# Patient Record
Sex: MALE | Race: WHITE | Employment: FULL TIME | URBAN - METROPOLITAN AREA
[De-identification: names, ages, dates, MRNs, and addresses within clinical notes are randomized per-mention and may not be internally consistent; named-entity substitution may affect disease eponyms.]

---

## 2017-11-07 ENCOUNTER — APPOINTMENT (OUTPATIENT)
Dept: GENERAL RADIOLOGY | Facility: HOSPITAL | Age: 57
End: 2017-11-07
Attending: EMERGENCY MEDICINE
Payer: COMMERCIAL

## 2017-11-07 ENCOUNTER — HOSPITAL ENCOUNTER (EMERGENCY)
Facility: HOSPITAL | Age: 57
Discharge: HOME | End: 2017-11-07
Attending: EMERGENCY MEDICINE
Payer: COMMERCIAL

## 2017-11-07 VITALS
RESPIRATION RATE: 22 BRPM | HEART RATE: 86 BPM | BODY MASS INDEX: 29.33 KG/M2 | DIASTOLIC BLOOD PRESSURE: 81 MMHG | OXYGEN SATURATION: 98 % | WEIGHT: 198 LBS | HEIGHT: 69 IN | TEMPERATURE: 98 F | SYSTOLIC BLOOD PRESSURE: 139 MMHG

## 2017-11-07 DIAGNOSIS — J20.9 ACUTE BRONCHITIS, UNSPECIFIED ORGANISM: Primary | ICD-10-CM

## 2017-11-07 PROCEDURE — 94640 AIRWAY INHALATION TREATMENT: CPT

## 2017-11-07 PROCEDURE — 99283 EMERGENCY DEPT VISIT LOW MDM: CPT

## 2017-11-07 PROCEDURE — 71020 XR CHEST PA + LAT CHEST (CPT=71020): CPT | Performed by: EMERGENCY MEDICINE

## 2017-11-07 PROCEDURE — 99284 EMERGENCY DEPT VISIT MOD MDM: CPT

## 2017-11-07 RX ORDER — ALBUTEROL SULFATE 90 UG/1
2 AEROSOL, METERED RESPIRATORY (INHALATION) EVERY 4 HOURS PRN
Qty: 1 INHALER | Refills: 0 | Status: SHIPPED | OUTPATIENT
Start: 2017-11-07 | End: 2017-12-07

## 2017-11-07 RX ORDER — CODEINE PHOSPHATE AND GUAIFENESIN 10; 100 MG/5ML; MG/5ML
5 SOLUTION ORAL EVERY 6 HOURS PRN
Qty: 120 ML | Refills: 0 | Status: SHIPPED | OUTPATIENT
Start: 2017-11-07 | End: 2021-10-11

## 2017-11-07 RX ORDER — ALBUTEROL SULFATE 2.5 MG/3ML
2.5 SOLUTION RESPIRATORY (INHALATION) ONCE
Status: COMPLETED | OUTPATIENT
Start: 2017-11-07 | End: 2017-11-07

## 2017-11-07 RX ORDER — PREDNISONE 20 MG/1
40 TABLET ORAL DAILY
Qty: 6 TABLET | Refills: 0 | Status: SHIPPED | OUTPATIENT
Start: 2017-11-07 | End: 2017-11-10

## 2017-11-07 NOTE — ED INITIAL ASSESSMENT (HPI)
Pt to ED with c/o cough and fever that started yesterday. Sts he is coughing up yellow phlegm. Denies CP. No resp distress noted. Dry cough noted.

## 2017-11-07 NOTE — ED PROVIDER NOTES
Patient Seen in: [facility] Emergency Department    History   Patient presents with:  Cough/URI    Stated Complaint: cough,fever    HPI    53-year-old male complaining of cough the patient a cough felt like he might had a fever coughing up some yell given a prescription for prednisone for 3 days a Robitussin-AC and Ventolin inhaler advised to follow-up his doctor in a few days return any problems            Disposition and Plan     Clinical Impression:  Acute bronchitis, unspecified organism  (primary

## 2017-11-07 NOTE — ED NOTES
DC instructions and RXs handed to pt. Pt denies any needs at this time. No distress noted. Pt on cell phone. Denies need for [facility] out of ED.

## 2019-11-18 ENCOUNTER — HOSPITAL ENCOUNTER (EMERGENCY)
Facility: HOSPITAL | Age: 59
Discharge: HOME | End: 2019-11-18
Attending: EMERGENCY MEDICINE
Payer: COMMERCIAL

## 2019-11-18 ENCOUNTER — LAB ENCOUNTER (OUTPATIENT)
Dept: LAB | Facility: HOSPITAL | Age: 59
End: 2019-11-18
Attending: EMERGENCY MEDICINE
Payer: COMMERCIAL

## 2019-11-18 VITALS
BODY MASS INDEX: 30.31 KG/M2 | HEIGHT: 68 IN | RESPIRATION RATE: 16 BRPM | HEART RATE: 72 BPM | OXYGEN SATURATION: 97 % | WEIGHT: 200 LBS | DIASTOLIC BLOOD PRESSURE: 71 MMHG | TEMPERATURE: 98 F | SYSTOLIC BLOOD PRESSURE: 128 MMHG

## 2019-11-18 DIAGNOSIS — R19.7 DIARRHEA: Primary | ICD-10-CM

## 2019-11-18 DIAGNOSIS — N28.9 RENAL INSUFFICIENCY: ICD-10-CM

## 2019-11-18 DIAGNOSIS — E86.0 DEHYDRATION: Primary | ICD-10-CM

## 2019-11-18 PROCEDURE — 99284 EMERGENCY DEPT VISIT MOD MDM: CPT

## 2019-11-18 PROCEDURE — 87045 FECES CULTURE AEROBIC BACT: CPT | Performed by: EMERGENCY MEDICINE

## 2019-11-18 PROCEDURE — 87427 SHIGA-LIKE TOXIN AG IA: CPT | Performed by: EMERGENCY MEDICINE

## 2019-11-18 PROCEDURE — 84132 ASSAY OF SERUM POTASSIUM: CPT | Performed by: EMERGENCY MEDICINE

## 2019-11-18 PROCEDURE — 84450 TRANSFERASE (AST) (SGOT): CPT | Performed by: EMERGENCY MEDICINE

## 2019-11-18 PROCEDURE — 87493 C DIFF AMPLIFIED PROBE: CPT

## 2019-11-18 PROCEDURE — 87046 STOOL CULTR AEROBIC BACT EA: CPT | Performed by: EMERGENCY MEDICINE

## 2019-11-18 PROCEDURE — 82272 OCCULT BLD FECES 1-3 TESTS: CPT | Performed by: EMERGENCY MEDICINE

## 2019-11-18 PROCEDURE — 85025 COMPLETE CBC W/AUTO DIFF WBC: CPT | Performed by: EMERGENCY MEDICINE

## 2019-11-18 PROCEDURE — 87493 C DIFF AMPLIFIED PROBE: CPT | Performed by: EMERGENCY MEDICINE

## 2019-11-18 PROCEDURE — 80053 COMPREHEN METABOLIC PANEL: CPT | Performed by: EMERGENCY MEDICINE

## 2019-11-18 PROCEDURE — 96360 HYDRATION IV INFUSION INIT: CPT

## 2019-11-18 RX ORDER — ALPRAZOLAM 0.25 MG/1
0.25 TABLET ORAL 3 TIMES DAILY PRN
COMMUNITY
End: 2021-12-22

## 2019-11-18 RX ORDER — ATORVASTATIN CALCIUM 40 MG/1
40 TABLET, FILM COATED ORAL NIGHTLY
COMMUNITY
End: 2021-10-11

## 2019-11-18 RX ORDER — PANTOPRAZOLE SODIUM 40 MG/1
40 TABLET, DELAYED RELEASE ORAL
COMMUNITY
End: 2021-10-11

## 2019-11-18 NOTE — ED PROVIDER NOTES
Patient Seen in: [facility] Emergency Department      History   Patient presents with:  Nausea/Vomiting/Diarrhea (gastrointestinal)    Stated Complaint: NVD    HPI    Patient is a 53-year-old male presents to ED for evaluation and diarrhea.   Patient MUSCULOSKELETAL: No calf tenderness. Dorsalis and Posterior Tibial pulses present. No clubbing. No cyanosis. No edema. SKIN EXAMINATIoN: Warm and dry with normal appearance. No rashes or lesions. NEUROLOGICAL:  Motor strength intact all groups.   no tests on the individual orders. STOOL CULTURE(P)   SHIGATOXIN   Sodium 133. BUN 35. Creatinine 1.36. Glucose 101               MDM     Patient is a 16-year-old male comes to emergency room for evaluation of loose stool. Stool sample sent.   Patient wi

## 2019-11-18 NOTE — ED INITIAL ASSESSMENT (HPI)
PT CO URI AND DIARRHEA. PT WANTS TO HAVE HIS KIDNEYS CHECKED. HX IN PAST OF CDIFF 2015 BUT HAS NOT BEEN ON ANY ANTIBX.

## 2019-11-18 NOTE — ED NOTES
Spoke  With patient and informed that patient stool for c diff had to be canceled due to formed stool

## 2021-06-22 ENCOUNTER — IMAGING SERVICES (OUTPATIENT)
Dept: CT IMAGING | Age: 61
End: 2021-06-22
Attending: UROLOGY

## 2021-06-22 DIAGNOSIS — R31.0 GROSS HEMATURIA: ICD-10-CM

## 2021-06-22 PROCEDURE — 74178 CT ABD&PLV WO CNTR FLWD CNTR: CPT | Performed by: RADIOLOGY

## 2021-10-11 ENCOUNTER — OFFICE VISIT (OUTPATIENT)
Dept: INTERNAL MEDICINE CLINIC | Facility: CLINIC | Age: 61
End: 2021-10-11
Payer: COMMERCIAL

## 2021-10-11 VITALS
HEIGHT: 68 IN | HEART RATE: 68 BPM | WEIGHT: 194.38 LBS | DIASTOLIC BLOOD PRESSURE: 60 MMHG | OXYGEN SATURATION: 97 % | BODY MASS INDEX: 29.46 KG/M2 | SYSTOLIC BLOOD PRESSURE: 118 MMHG

## 2021-10-11 DIAGNOSIS — E78.1 HYPERTRIGLYCERIDEMIA: ICD-10-CM

## 2021-10-11 DIAGNOSIS — C67.9 MALIGNANT NEOPLASM OF URINARY BLADDER, UNSPECIFIED SITE (HCC): ICD-10-CM

## 2021-10-11 DIAGNOSIS — I10 ESSENTIAL HYPERTENSION: Primary | ICD-10-CM

## 2021-10-11 DIAGNOSIS — R73.03 PREDIABETES: ICD-10-CM

## 2021-10-11 DIAGNOSIS — Z87.39 HISTORY OF GOUT: ICD-10-CM

## 2021-10-11 DIAGNOSIS — F41.9 ANXIETY: ICD-10-CM

## 2021-10-11 PROCEDURE — 3074F SYST BP LT 130 MM HG: CPT | Performed by: FAMILY MEDICINE

## 2021-10-11 PROCEDURE — 99204 OFFICE O/P NEW MOD 45 MIN: CPT | Performed by: FAMILY MEDICINE

## 2021-10-11 PROCEDURE — 90471 IMMUNIZATION ADMIN: CPT | Performed by: FAMILY MEDICINE

## 2021-10-11 PROCEDURE — 3008F BODY MASS INDEX DOCD: CPT | Performed by: FAMILY MEDICINE

## 2021-10-11 PROCEDURE — 3078F DIAST BP <80 MM HG: CPT | Performed by: FAMILY MEDICINE

## 2021-10-11 PROCEDURE — 90686 IIV4 VACC NO PRSV 0.5 ML IM: CPT | Performed by: FAMILY MEDICINE

## 2021-10-11 RX ORDER — AZILSARTAN KAMEDOXOMIL AND CHLORTHALIDONE 40; 25 MG/1; MG/1
1 TABLET ORAL DAILY
COMMUNITY
Start: 2019-05-31

## 2021-10-11 RX ORDER — ALBUTEROL SULFATE 90 UG/1
AEROSOL, METERED RESPIRATORY (INHALATION) EVERY 6 HOURS PRN
COMMUNITY

## 2021-10-11 RX ORDER — ROSUVASTATIN CALCIUM 10 MG/1
10 TABLET, COATED ORAL DAILY
COMMUNITY

## 2021-10-11 RX ORDER — AMLODIPINE BESYLATE 5 MG/1
5 TABLET ORAL DAILY
COMMUNITY

## 2021-10-11 RX ORDER — MONTELUKAST SODIUM 10 MG/1
10 TABLET ORAL DAILY
COMMUNITY
Start: 2021-08-27

## 2021-10-11 NOTE — PROGRESS NOTES
Subjective:   Patient ID: Félix Madison is a 64year old male. HPI Here to establish care. Patient sees Cardiology twice yearly. PCP had been filling his BP and cholesterol med though. Sees Urology for bladder cancer, treating with BCG.  Sees allergi mouth daily. • ALPRAZolam 0.25 MG Oral Tab Take 0.25 mg by mouth 3 (three) times daily as needed. • allopurinol 300 MG Oral Tab Take 300 mg by mouth daily. • Nebivolol HCl (BYSTOLIC) 10 MG Oral Tab Take 20 mg by mouth daily.        Allergies:  S

## 2021-10-12 ENCOUNTER — MED REC SCAN ONLY (OUTPATIENT)
Dept: INTERNAL MEDICINE CLINIC | Facility: CLINIC | Age: 61
End: 2021-10-12

## 2021-12-22 DIAGNOSIS — I10 ESSENTIAL HYPERTENSION: Primary | ICD-10-CM

## 2021-12-22 DIAGNOSIS — Z13.0 SCREENING FOR BLOOD DISEASE: ICD-10-CM

## 2021-12-22 DIAGNOSIS — Z13.228 SCREENING FOR METABOLIC DISORDER: ICD-10-CM

## 2021-12-22 DIAGNOSIS — E78.1 HYPERTRIGLYCERIDEMIA: ICD-10-CM

## 2021-12-22 DIAGNOSIS — R73.03 PREDIABETES: ICD-10-CM

## 2021-12-22 DIAGNOSIS — Z00.00 ANNUAL PHYSICAL EXAM: ICD-10-CM

## 2021-12-22 DIAGNOSIS — Z13.220 SCREENING FOR LIPID DISORDERS: ICD-10-CM

## 2021-12-22 RX ORDER — ALPRAZOLAM 0.25 MG/1
TABLET ORAL
Qty: 30 TABLET | Refills: 0 | Status: SHIPPED | OUTPATIENT
Start: 2021-12-22

## 2021-12-22 NOTE — TELEPHONE ENCOUNTER
Last visit- 10/11/2021 hypertension     Last refill- alprazolam 0.25mg (external/patient, reported)    Last labs- no recent labs     No future appointments.

## 2022-02-25 ENCOUNTER — TELEPHONE (OUTPATIENT)
Dept: INTERNAL MEDICINE CLINIC | Facility: CLINIC | Age: 62
End: 2022-02-25

## 2022-02-25 NOTE — TELEPHONE ENCOUNTER
CPE   Future Appointments   Date Time Provider Shan Charito   3/30/2022  4:45 PM Abby Healy,  [facility]   4/18/2022  7:00 AM Davide Jones MD [facility] SUB GI      Orders to  ?  Will call to let us what's his preferred lab   aware must fast no call back required

## 2022-02-25 NOTE — TELEPHONE ENCOUNTER
CPE   Future Appointments   Date Time Provider Shan Mcneill   3/30/2022  4:45 PM Jordan Rizzo DO [facility]      Orders to  aware must fast no call back required

## 2022-02-28 RX ORDER — AMLODIPINE BESYLATE 5 MG/1
TABLET ORAL
Qty: 90 TABLET | Refills: 0 | Status: SHIPPED | OUTPATIENT
Start: 2022-02-28

## 2022-02-28 NOTE — TELEPHONE ENCOUNTER
Last visit- 10/11/2021 hypertension     Last refill- amlodipine 5mg (external/patient reported)    Last labs- no recent labs   Future Appointments   Date Time Provider Shan Mcneill   3/30/2022  4:45 PM Magali Moreno DO [facility]   4/18/2022  7:00 AM Orestes Jones MD [facility] SUB GI

## 2022-03-16 RX ORDER — ALPRAZOLAM 0.25 MG/1
TABLET ORAL
Qty: 30 TABLET | Refills: 0 | Status: SHIPPED | OUTPATIENT
Start: 2022-03-16

## 2022-03-16 NOTE — TELEPHONE ENCOUNTER
Last visit- 10/11/2021 hypertension     Last refill- 12/22/2021 alprazolam 0.25mg QTY30 0R    Last labs- no recent labs   Future Appointments   Date Time Provider Shan Charito   3/30/2022  4:45 PM Kevin Sep, DO [facility]   4/18/2022  7:00 AM Luna Jones MD [facility] SUB GI

## 2022-03-25 ENCOUNTER — LAB ENCOUNTER (OUTPATIENT)
Dept: LAB | Age: 62
End: 2022-03-25
Attending: FAMILY MEDICINE
Payer: COMMERCIAL

## 2022-03-25 DIAGNOSIS — Z13.220 SCREENING FOR LIPID DISORDERS: ICD-10-CM

## 2022-03-25 DIAGNOSIS — Z13.228 SCREENING FOR METABOLIC DISORDER: ICD-10-CM

## 2022-03-25 DIAGNOSIS — R73.03 PREDIABETES: ICD-10-CM

## 2022-03-25 DIAGNOSIS — Z13.0 SCREENING FOR BLOOD DISEASE: ICD-10-CM

## 2022-03-25 DIAGNOSIS — Z00.00 ANNUAL PHYSICAL EXAM: ICD-10-CM

## 2022-03-25 DIAGNOSIS — I10 ESSENTIAL HYPERTENSION: ICD-10-CM

## 2022-03-25 DIAGNOSIS — E78.1 HYPERTRIGLYCERIDEMIA: ICD-10-CM

## 2022-03-25 LAB
ALBUMIN SERPL-MCNC: 4.1 G/DL (ref 3.4–5)
ALBUMIN/GLOB SERPL: 1.2 {RATIO} (ref 1–2)
ALP LIVER SERPL-CCNC: 91 U/L
ALT SERPL-CCNC: 33 U/L
ANION GAP SERPL CALC-SCNC: 1 MMOL/L (ref 0–18)
AST SERPL-CCNC: 19 U/L (ref 15–37)
BASOPHILS # BLD AUTO: 0.04 X10(3) UL (ref 0–0.2)
BASOPHILS NFR BLD AUTO: 0.5 %
BILIRUB SERPL-MCNC: 0.4 MG/DL (ref 0.1–2)
BUN BLD-MCNC: 34 MG/DL (ref 7–18)
CALCIUM BLD-MCNC: 9.4 MG/DL (ref 8.5–10.1)
CHLORIDE SERPL-SCNC: 104 MMOL/L (ref 98–112)
CHOLEST SERPL-MCNC: 165 MG/DL (ref ?–200)
CO2 SERPL-SCNC: 31 MMOL/L (ref 21–32)
CREAT BLD-MCNC: 0.96 MG/DL
EOSINOPHIL # BLD AUTO: 0.22 X10(3) UL (ref 0–0.7)
EOSINOPHIL NFR BLD AUTO: 2.8 %
ERYTHROCYTE [DISTWIDTH] IN BLOOD BY AUTOMATED COUNT: 12.2 %
EST. AVERAGE GLUCOSE BLD GHB EST-MCNC: 120 MG/DL (ref 68–126)
FASTING PATIENT LIPID ANSWER: YES
FASTING STATUS PATIENT QL REPORTED: YES
GLOBULIN PLAS-MCNC: 3.5 G/DL (ref 2.8–4.4)
GLUCOSE BLD-MCNC: 104 MG/DL (ref 70–99)
HBA1C MFR BLD: 5.8 % (ref ?–5.7)
HCT VFR BLD AUTO: 46.7 %
HDLC SERPL-MCNC: 50 MG/DL (ref 40–59)
IMM GRANULOCYTES # BLD AUTO: 0.03 X10(3) UL (ref 0–1)
IMM GRANULOCYTES NFR BLD: 0.4 %
LDLC SERPL CALC-MCNC: 83 MG/DL (ref ?–100)
LYMPHOCYTES # BLD AUTO: 3.53 X10(3) UL (ref 1–4)
LYMPHOCYTES NFR BLD AUTO: 44.3 %
MCH RBC QN AUTO: 33.7 PG (ref 26–34)
MCHC RBC AUTO-ENTMCNC: 34.5 G/DL (ref 31–37)
MCV RBC AUTO: 97.7 FL
MONOCYTES NFR BLD AUTO: 8.8 %
NEUTROPHILS # BLD AUTO: 3.45 X10 (3) UL (ref 1.5–7.7)
NEUTROPHILS # BLD AUTO: 3.45 X10(3) UL (ref 1.5–7.7)
NEUTROPHILS NFR BLD AUTO: 43.2 %
NONHDLC SERPL-MCNC: 115 MG/DL (ref ?–130)
OSMOLALITY SERPL CALC.SUM OF ELEC: 290 MOSM/KG (ref 275–295)
PLATELET # BLD AUTO: 278 10(3)UL (ref 150–450)
POTASSIUM SERPL-SCNC: 4 MMOL/L (ref 3.5–5.1)
PROT SERPL-MCNC: 7.6 G/DL (ref 6.4–8.2)
RBC # BLD AUTO: 4.78 X10(6)UL
SODIUM SERPL-SCNC: 136 MMOL/L (ref 136–145)
TRIGL SERPL-MCNC: 190 MG/DL (ref 30–149)
VLDLC SERPL CALC-MCNC: 30 MG/DL (ref 0–30)
WBC # BLD AUTO: 8 X10(3) UL (ref 4–11)

## 2022-03-25 PROCEDURE — 80053 COMPREHEN METABOLIC PANEL: CPT

## 2022-03-25 PROCEDURE — 83036 HEMOGLOBIN GLYCOSYLATED A1C: CPT

## 2022-03-25 PROCEDURE — 85025 COMPLETE CBC W/AUTO DIFF WBC: CPT

## 2022-03-25 PROCEDURE — 36415 COLL VENOUS BLD VENIPUNCTURE: CPT

## 2022-03-25 PROCEDURE — 80061 LIPID PANEL: CPT

## 2022-03-30 ENCOUNTER — OFFICE VISIT (OUTPATIENT)
Dept: INTERNAL MEDICINE CLINIC | Facility: CLINIC | Age: 62
End: 2022-03-30
Payer: COMMERCIAL

## 2022-03-30 VITALS
BODY MASS INDEX: 29.86 KG/M2 | HEART RATE: 71 BPM | OXYGEN SATURATION: 96 % | HEIGHT: 68 IN | SYSTOLIC BLOOD PRESSURE: 120 MMHG | DIASTOLIC BLOOD PRESSURE: 64 MMHG | WEIGHT: 197 LBS

## 2022-03-30 DIAGNOSIS — Z00.00 ANNUAL PHYSICAL EXAM: Primary | ICD-10-CM

## 2022-03-30 DIAGNOSIS — I10 PRIMARY HYPERTENSION: ICD-10-CM

## 2022-03-30 DIAGNOSIS — F41.1 GAD (GENERALIZED ANXIETY DISORDER): ICD-10-CM

## 2022-03-30 DIAGNOSIS — C67.9 MALIGNANT NEOPLASM OF URINARY BLADDER, UNSPECIFIED SITE (HCC): ICD-10-CM

## 2022-03-30 DIAGNOSIS — R73.03 PREDIABETES: ICD-10-CM

## 2022-03-30 PROCEDURE — 3078F DIAST BP <80 MM HG: CPT | Performed by: FAMILY MEDICINE

## 2022-03-30 PROCEDURE — 99213 OFFICE O/P EST LOW 20 MIN: CPT | Performed by: FAMILY MEDICINE

## 2022-03-30 PROCEDURE — 96127 BRIEF EMOTIONAL/BEHAV ASSMT: CPT | Performed by: FAMILY MEDICINE

## 2022-03-30 PROCEDURE — 3008F BODY MASS INDEX DOCD: CPT | Performed by: FAMILY MEDICINE

## 2022-03-30 PROCEDURE — 99396 PREV VISIT EST AGE 40-64: CPT | Performed by: FAMILY MEDICINE

## 2022-03-30 PROCEDURE — 3074F SYST BP LT 130 MM HG: CPT | Performed by: FAMILY MEDICINE

## 2022-03-30 RX ORDER — ESCITALOPRAM OXALATE 10 MG/1
TABLET ORAL
Qty: 30 TABLET | Refills: 0 | Status: SHIPPED | OUTPATIENT
Start: 2022-03-30

## 2022-05-16 RX ORDER — SERTRALINE HYDROCHLORIDE 25 MG/1
50 TABLET, FILM COATED ORAL DAILY
Qty: 180 TABLET | Refills: 1 | Status: SHIPPED | OUTPATIENT
Start: 2022-05-16 | End: 2022-08-14

## 2022-05-16 NOTE — TELEPHONE ENCOUNTER
Last visit04/22/2022 betsy    Last refill- 04/22/2022 sertraline 25mg QTY60 0R    Last labs- 03/25/2022 lipid, cbc, cmp, hemoglobin a1c  Future Appointments   Date Time Provider Shan Mcneill   6/15/2022  2:45 PM Hayden Ramos MD [facility] SUB GI

## 2022-05-27 RX ORDER — AMLODIPINE BESYLATE 5 MG/1
TABLET ORAL
Qty: 90 TABLET | Refills: 0 | Status: SHIPPED | OUTPATIENT
Start: 2022-05-27

## 2022-05-27 NOTE — TELEPHONE ENCOUNTER
Last visit- 04/22/2022 betsy    Last refill- 02/28/2022 amlodipine 5mg QTY90 0R    Last labs- 03/25/2022 lipid, cbc, cmp, hemoglobin a1c  Future Appointments   Date Time Provider Shan Mcneill   6/15/2022  2:45 PM Getachew Brooks MD [facility] SUB GI

## 2022-06-12 ENCOUNTER — LAB ENCOUNTER (OUTPATIENT)
Dept: LAB | Facility: HOSPITAL | Age: 62
End: 2022-06-12
Attending: INTERNAL MEDICINE
Payer: COMMERCIAL

## 2022-06-12 DIAGNOSIS — Z01.818 PRE-OP TESTING: ICD-10-CM

## 2022-06-12 LAB — SARS-COV-2 RNA RESP QL NAA+PROBE: NOT DETECTED

## 2022-06-13 RX ORDER — NEBIVOLOL 20 MG/1
TABLET ORAL
Qty: 90 TABLET | Refills: 1 | Status: SHIPPED | OUTPATIENT
Start: 2022-06-13

## 2022-06-13 NOTE — TELEPHONE ENCOUNTER
Last visit- 04/22/2022 betsy     Last refill- nebivolol hcl (bystolic) 02PL (external/patient reported)    Last labs- 03/25/2022 lipid, cbc, cmp, hemoglobin a1c  Future Appointments   Date Time Provider Shan Mcneill   6/15/2022  2:45 PM Dakota Oconnell MD [facility] SUB GI

## 2022-06-15 PROBLEM — Z86.0101 HISTORY OF ADENOMATOUS POLYP OF COLON: Status: ACTIVE | Noted: 2022-06-15

## 2022-06-15 PROBLEM — Z80.0 FAMILY HISTORY OF COLON CANCER: Status: ACTIVE | Noted: 2022-06-15

## 2022-06-15 PROBLEM — Z86.010 HISTORY OF ADENOMATOUS POLYP OF COLON: Status: ACTIVE | Noted: 2022-06-15

## 2022-06-28 ENCOUNTER — TELEPHONE (OUTPATIENT)
Dept: INTERNAL MEDICINE CLINIC | Facility: CLINIC | Age: 62
End: 2022-06-28

## 2022-06-28 DIAGNOSIS — Z01.818 PREOPERATIVE EXAMINATION: ICD-10-CM

## 2022-06-28 DIAGNOSIS — C67.9 MALIGNANT NEOPLASM OF URINARY BLADDER, UNSPECIFIED SITE (HCC): Primary | ICD-10-CM

## 2022-06-28 NOTE — TELEPHONE ENCOUNTER
Pt stated he is off work the next 3 days in case he needs labs done wants to do it during the next 3 days-please call him and let him know if he will need labs done

## 2022-06-28 NOTE — TELEPHONE ENCOUNTER
Please call [facility] at [phone] to get formal pre-op request including diagnosis (reason for procedure), procedure name, request for medical clearance, specific labs/EKG/chest xray patient is getting done, surgeon, and date of procedure.

## 2022-06-28 NOTE — TELEPHONE ENCOUNTER
Patient walked into office with pre-op paperwork  Surgery is 7/6/2022  Patient is schedule tomorrow morning at [facility]  in [place]. for EKG   Patient will have the results faxed to the office   Paperwork is in 's folder  Scheduled appointment for 07/01  Lab work needed? ?  MA has signed medical records request signed by patient in case we need anything

## 2022-06-29 NOTE — TELEPHONE ENCOUNTER
Please call patient. I think he mentioned he already had lab orders and EKG order from surgeon to do for pre-op but just verify both. Otherwise, I have placed orders for labs and EKG if he was not already doing them with the surgeon. If he has already had them through the surgeon's office, make it very clear to patient he needs to make sure these results get to me for his pre-op with me that is scheduled for 7/1/22.

## 2022-06-29 NOTE — TELEPHONE ENCOUNTER
Spoke to pt and asked him to get labs done tomorrow for his upcoming surgery. Informed pt we received ekg but he still needs labs done. Pt understood and stated he will get labs done tomorrow morning. No further questions.

## 2022-06-30 ENCOUNTER — LAB ENCOUNTER (OUTPATIENT)
Dept: LAB | Age: 62
End: 2022-06-30
Attending: FAMILY MEDICINE
Payer: COMMERCIAL

## 2022-06-30 DIAGNOSIS — Z01.818 PREOPERATIVE EXAMINATION: ICD-10-CM

## 2022-06-30 DIAGNOSIS — C67.9 MALIGNANT NEOPLASM OF URINARY BLADDER, UNSPECIFIED SITE (HCC): ICD-10-CM

## 2022-06-30 DIAGNOSIS — R73.09 ELEVATED RANDOM BLOOD GLUCOSE LEVEL: Primary | ICD-10-CM

## 2022-06-30 DIAGNOSIS — R73.09 ELEVATED RANDOM BLOOD GLUCOSE LEVEL: ICD-10-CM

## 2022-06-30 LAB
ALBUMIN SERPL-MCNC: 4.1 G/DL (ref 3.4–5)
ALBUMIN/GLOB SERPL: 1.1 {RATIO} (ref 1–2)
ALP LIVER SERPL-CCNC: 98 U/L
ALT SERPL-CCNC: 35 U/L
ANION GAP SERPL CALC-SCNC: 5 MMOL/L (ref 0–18)
AST SERPL-CCNC: 26 U/L (ref 15–37)
BASOPHILS # BLD AUTO: 0.06 X10(3) UL (ref 0–0.2)
BASOPHILS NFR BLD AUTO: 0.8 %
BILIRUB SERPL-MCNC: 0.2 MG/DL (ref 0.1–2)
BUN BLD-MCNC: 26 MG/DL (ref 7–18)
CALCIUM BLD-MCNC: 9.3 MG/DL (ref 8.5–10.1)
CHLORIDE SERPL-SCNC: 105 MMOL/L (ref 98–112)
CO2 SERPL-SCNC: 28 MMOL/L (ref 21–32)
CREAT BLD-MCNC: 1.02 MG/DL
EOSINOPHIL # BLD AUTO: 0.23 X10(3) UL (ref 0–0.7)
EOSINOPHIL NFR BLD AUTO: 3 %
ERYTHROCYTE [DISTWIDTH] IN BLOOD BY AUTOMATED COUNT: 12.4 %
EST. AVERAGE GLUCOSE BLD GHB EST-MCNC: 120 MG/DL (ref 68–126)
FASTING STATUS PATIENT QL REPORTED: YES
GLOBULIN PLAS-MCNC: 3.6 G/DL (ref 2.8–4.4)
GLUCOSE BLD-MCNC: 161 MG/DL (ref 70–99)
HBA1C MFR BLD: 5.8 % (ref ?–5.7)
HCT VFR BLD AUTO: 44.3 %
HGB BLD-MCNC: 15.4 G/DL
IMM GRANULOCYTES # BLD AUTO: 0.03 X10(3) UL (ref 0–1)
IMM GRANULOCYTES NFR BLD: 0.4 %
LYMPHOCYTES # BLD AUTO: 3.4 X10(3) UL (ref 1–4)
LYMPHOCYTES NFR BLD AUTO: 44.1 %
MCH RBC QN AUTO: 35.2 PG (ref 26–34)
MCHC RBC AUTO-ENTMCNC: 34.8 G/DL (ref 31–37)
MCV RBC AUTO: 101.1 FL
MONOCYTES # BLD AUTO: 0.61 X10(3) UL (ref 0.1–1)
MONOCYTES NFR BLD AUTO: 7.9 %
NEUTROPHILS # BLD AUTO: 3.38 X10 (3) UL (ref 1.5–7.7)
NEUTROPHILS # BLD AUTO: 3.38 X10(3) UL (ref 1.5–7.7)
NEUTROPHILS NFR BLD AUTO: 43.8 %
OSMOLALITY SERPL CALC.SUM OF ELEC: 294 MOSM/KG (ref 275–295)
PLATELET # BLD AUTO: 270 10(3)UL (ref 150–450)
POTASSIUM SERPL-SCNC: 3.8 MMOL/L (ref 3.5–5.1)
PROT SERPL-MCNC: 7.7 G/DL (ref 6.4–8.2)
RBC # BLD AUTO: 4.38 X10(6)UL
SODIUM SERPL-SCNC: 138 MMOL/L (ref 136–145)
WBC # BLD AUTO: 7.7 X10(3) UL (ref 4–11)

## 2022-06-30 PROCEDURE — 36415 COLL VENOUS BLD VENIPUNCTURE: CPT

## 2022-06-30 PROCEDURE — 83036 HEMOGLOBIN GLYCOSYLATED A1C: CPT

## 2022-06-30 PROCEDURE — 80053 COMPREHEN METABOLIC PANEL: CPT

## 2022-06-30 PROCEDURE — 85025 COMPLETE CBC W/AUTO DIFF WBC: CPT

## 2022-07-01 ENCOUNTER — TELEPHONE (OUTPATIENT)
Dept: INTERNAL MEDICINE CLINIC | Facility: CLINIC | Age: 62
End: 2022-07-01

## 2022-07-01 ENCOUNTER — OFFICE VISIT (OUTPATIENT)
Dept: INTERNAL MEDICINE CLINIC | Facility: CLINIC | Age: 62
End: 2022-07-01
Payer: COMMERCIAL

## 2022-07-01 VITALS
OXYGEN SATURATION: 98 % | HEART RATE: 89 BPM | BODY MASS INDEX: 30.52 KG/M2 | DIASTOLIC BLOOD PRESSURE: 66 MMHG | HEIGHT: 68 IN | WEIGHT: 201.38 LBS | SYSTOLIC BLOOD PRESSURE: 128 MMHG

## 2022-07-01 DIAGNOSIS — C67.9 MALIGNANT NEOPLASM OF URINARY BLADDER, UNSPECIFIED SITE (HCC): ICD-10-CM

## 2022-07-01 DIAGNOSIS — Z01.818 PREOPERATIVE EXAMINATION: Primary | ICD-10-CM

## 2022-07-01 PROCEDURE — 3008F BODY MASS INDEX DOCD: CPT | Performed by: FAMILY MEDICINE

## 2022-07-01 PROCEDURE — 99214 OFFICE O/P EST MOD 30 MIN: CPT | Performed by: FAMILY MEDICINE

## 2022-07-01 PROCEDURE — 3078F DIAST BP <80 MM HG: CPT | Performed by: FAMILY MEDICINE

## 2022-07-01 PROCEDURE — 3074F SYST BP LT 130 MM HG: CPT | Performed by: FAMILY MEDICINE

## 2022-07-01 NOTE — TELEPHONE ENCOUNTER
Please fax my H&P, labs, EKG and note from Cardiologist (all attached to pre-op paperwork in my folder) to appropriate location for pre-op. Procedure is 7/6/22.

## 2022-07-01 NOTE — TELEPHONE ENCOUNTER
All information below faxed to Urology at [phone] and put back in AMS folder with confirmation.

## 2022-08-24 RX ORDER — ROSUVASTATIN CALCIUM 10 MG/1
10 TABLET, COATED ORAL DAILY
Qty: 90 TABLET | Refills: 2 | Status: SHIPPED | OUTPATIENT
Start: 2022-08-24

## 2022-08-24 NOTE — TELEPHONE ENCOUNTER
Last visit- 07/01/2022 pre-op exam     Last refill- rosuvastatin 10mg (external/patient reported)     Last labs- 06/30/2022 hemoglobin a1c, cbc, cmp     No future appointments.

## 2022-08-29 RX ORDER — AMLODIPINE BESYLATE 5 MG/1
TABLET ORAL
Qty: 90 TABLET | Refills: 0 | Status: SHIPPED | OUTPATIENT
Start: 2022-08-29

## 2022-09-09 RX ORDER — AZILSARTAN KAMEDOXOMIL AND CHLORTHALIDONE 40; 25 MG/1; MG/1
1 TABLET ORAL DAILY
Qty: 30 TABLET | Refills: 0 | Status: SHIPPED | OUTPATIENT
Start: 2022-09-09

## 2022-09-09 NOTE — TELEPHONE ENCOUNTER
Last visit- 07/01/2022 pre-op physical     Last refill- azilsartan-chlorthalidone 40-25mg (external/patient reported)    Last labs- 06/30/2022 hemoglobin a1c, cbc, cmp    No future appointments.

## 2022-09-13 ENCOUNTER — TELEPHONE (OUTPATIENT)
Dept: INTERNAL MEDICINE CLINIC | Facility: CLINIC | Age: 62
End: 2022-09-13

## 2022-09-13 NOTE — TELEPHONE ENCOUNTER
Received echo report from [id number] completed on 09/07/2022. Placed in AMS bin to review. Will send to scan once signed.

## 2022-10-17 RX ORDER — NEBIVOLOL 20 MG/1
TABLET ORAL
Qty: 90 TABLET | Refills: 1 | Status: SHIPPED | OUTPATIENT
Start: 2022-10-17

## 2022-10-17 NOTE — TELEPHONE ENCOUNTER
Last visit- 07/01/2022 pre-op exam     Last refill- 06/13/2022 nebivolol hcl 20mg QTY90 1R    Last labs- 06/30/2022 hemoglobin a1c, cbc, cmp,     No future appointments.

## 2022-10-25 RX ORDER — ALLOPURINOL 300 MG/1
TABLET ORAL
Qty: 90 TABLET | Refills: 3 | Status: SHIPPED | OUTPATIENT
Start: 2022-10-25

## 2022-10-25 NOTE — TELEPHONE ENCOUNTER
Last visit- 07/01/2022 pre-op exam     Last refill- allopurinol 300mg (external/patient reported)     Last labs- 06/30/2022 hemoglobin a1c, cbc, cmp     No future appointments.

## 2022-11-23 RX ORDER — SERTRALINE HYDROCHLORIDE 25 MG/1
TABLET, FILM COATED ORAL
Qty: 180 TABLET | Refills: 1 | Status: SHIPPED | OUTPATIENT
Start: 2022-11-23

## 2022-11-30 RX ORDER — AMLODIPINE BESYLATE 5 MG/1
TABLET ORAL
Qty: 90 TABLET | Refills: 0 | Status: SHIPPED | OUTPATIENT
Start: 2022-11-30

## 2022-11-30 NOTE — TELEPHONE ENCOUNTER
Last visit- 07/01/2022 pre-op exam     Last refill- 08/29/2022 amlodipine 5mg QTY90 0R    Last labs- 06/30/2022 hemoglobin a1c, cbc, cmp     No future appointments.

## 2023-01-16 ENCOUNTER — TELEPHONE (OUTPATIENT)
Dept: INTERNAL MEDICINE CLINIC | Facility: CLINIC | Age: 63
End: 2023-01-16

## 2023-03-17 DIAGNOSIS — Z13.220 SCREENING FOR LIPID DISORDERS: ICD-10-CM

## 2023-03-17 DIAGNOSIS — R73.03 PREDIABETES: Primary | ICD-10-CM

## 2023-03-17 RX ORDER — AZILSARTAN KAMEDOXOMIL AND CHLORTHALIDONE 40; 25 MG/1; MG/1
TABLET ORAL
Qty: 90 TABLET | Refills: 0 | Status: SHIPPED | OUTPATIENT
Start: 2023-03-17

## 2023-03-17 NOTE — TELEPHONE ENCOUNTER
Please call patient to schedule annual check-up. Fasting lab orders are at [facility]. Last was 3/30/22.

## 2023-03-17 NOTE — TELEPHONE ENCOUNTER
Last visit- 07/01/2022 pre-op exam     Last refill- 09/13/2022 edarbyclor 40-25mg QTY90 1R    Last labs- 06/30/2022 hemoglobin a1c, cbc, cmp   Future Appointments   Date Time Provider Shan Mcneill   3/28/2023  9:00 AM Elisabeth Thomas MD [facility]

## 2023-03-20 NOTE — TELEPHONE ENCOUNTER
Patient is scheduled  Future Appointments   Date Time Provider Shan Charito   3/28/2023  9:00 AM Aditi Calero MD [facility]   6/9/2023  7:00 AM Delgado Salgado DO [facility]

## 2023-03-28 ENCOUNTER — OFFICE VISIT (OUTPATIENT)
Dept: SURGERY | Facility: CLINIC | Age: 63
End: 2023-03-28

## 2023-03-28 DIAGNOSIS — C67.9 MALIGNANT NEOPLASM OF URINARY BLADDER, UNSPECIFIED SITE (HCC): Primary | ICD-10-CM

## 2023-03-28 DIAGNOSIS — N13.8 BPH WITH OBSTRUCTION/LOWER URINARY TRACT SYMPTOMS: ICD-10-CM

## 2023-03-28 DIAGNOSIS — N40.1 BPH WITH OBSTRUCTION/LOWER URINARY TRACT SYMPTOMS: ICD-10-CM

## 2023-03-28 DIAGNOSIS — R82.90 URINE FINDING: ICD-10-CM

## 2023-03-28 LAB
APPEARANCE: CLEAR
BILIRUBIN: NEGATIVE
GLUCOSE (URINE DIPSTICK): NEGATIVE MG/DL
KETONES (URINE DIPSTICK): NEGATIVE MG/DL
LEUKOCYTES: NEGATIVE
MULTISTIX LOT#: NORMAL NUMERIC
NITRITE, URINE: NEGATIVE
OCCULT BLOOD: NEGATIVE
PH, URINE: 6 (ref 4.5–8)
PROTEIN (URINE DIPSTICK): NEGATIVE MG/DL
SPECIFIC GRAVITY: 1.01 (ref 1–1.03)
URINE-COLOR: YELLOW
UROBILINOGEN,SEMI-QN: 0.2 MG/DL (ref 0–1.9)

## 2023-03-28 PROCEDURE — 99204 OFFICE O/P NEW MOD 45 MIN: CPT | Performed by: SURGERY

## 2023-03-28 PROCEDURE — 81003 URINALYSIS AUTO W/O SCOPE: CPT | Performed by: SURGERY

## 2023-03-28 RX ORDER — TAMSULOSIN HYDROCHLORIDE 0.4 MG/1
0.4 CAPSULE ORAL
COMMUNITY
Start: 2022-11-23 | End: 2023-03-28

## 2023-03-28 RX ORDER — TAMSULOSIN HYDROCHLORIDE 0.4 MG/1
0.4 CAPSULE ORAL
Qty: 90 CAPSULE | Refills: 5 | Status: SHIPPED | OUTPATIENT
Start: 2023-03-28

## 2023-03-29 ENCOUNTER — TELEPHONE (OUTPATIENT)
Dept: INTERNAL MEDICINE CLINIC | Facility: CLINIC | Age: 63
End: 2023-03-29

## 2023-03-29 NOTE — PROGRESS NOTES
Adelaide Fearing,    I have reviewed your urine test results. Tests show no significant abnormalities (no signs of microscopic cancer cells in the urine). No changes to the plan as we had previously discussed. Please let me know if you have any questions.     Thanks,  Ledy Bray MD

## 2023-03-31 ENCOUNTER — TELEPHONE (OUTPATIENT)
Dept: INTERNAL MEDICINE CLINIC | Facility: CLINIC | Age: 63
End: 2023-03-31

## 2023-04-04 NOTE — TELEPHONE ENCOUNTER
Please call patient. We are working on changing his BP meds but would like to see the most recent Cardiology note to make sure we are transitioning his meds appropriately. Does he still see Cardiology twice yearly? When is the last time he saw them? We have a note from them from June, 2022- has he seen them more recently and if so, who did he see? If he has seen them more recently please call their office to have them fax most recent notes and any new imaging since June, 2022.

## 2023-04-05 RX ORDER — HYDROCHLOROTHIAZIDE 25 MG/1
25 TABLET ORAL DAILY
Qty: 90 TABLET | Refills: 0 | Status: SHIPPED | OUTPATIENT
Start: 2023-04-05

## 2023-04-05 RX ORDER — LOSARTAN POTASSIUM 50 MG/1
50 TABLET ORAL DAILY
Qty: 90 TABLET | Refills: 0 | Status: SHIPPED | OUTPATIENT
Start: 2023-04-05

## 2023-04-05 RX ORDER — METOPROLOL SUCCINATE 25 MG/1
25 TABLET, EXTENDED RELEASE ORAL DAILY
Qty: 90 TABLET | Refills: 0 | Status: SHIPPED | OUTPATIENT
Start: 2023-04-05 | End: 2023-04-26

## 2023-04-05 NOTE — TELEPHONE ENCOUNTER
Pt calling back to speak to MA and requesting a call back.   Pt stated he'll be more available after 1pm

## 2023-04-05 NOTE — TELEPHONE ENCOUNTER
LMTCB 4/5 to inform pt AMS sent in substitute. Will need BP check in 2 weeks. PSRs- please schedule pt with AMS in 2 weeks for BP check and then triage can speak to pt regarding med changes.

## 2023-04-05 NOTE — TELEPHONE ENCOUNTER
Please call patient to let him know we sent his new medications. The Naoma Dance was a combination of 2 different medications that are now  into one tablet each. So, instead of two different tablets, we are replacing it with three different tablets instead of a combo. He needs to make appt to see me in 2 weeks for BP check. He should call us sooner if any issues with new meds.

## 2023-04-07 NOTE — TELEPHONE ENCOUNTER
Pt states he last saw cardiology June 2022. He thinks he has an appointment this month with them but is not 100% sure.

## 2023-04-17 ENCOUNTER — TELEPHONE (OUTPATIENT)
Dept: SURGERY | Facility: CLINIC | Age: 63
End: 2023-04-17

## 2023-04-17 DIAGNOSIS — C67.9 MALIGNANT NEOPLASM OF URINARY BLADDER, UNSPECIFIED SITE (HCC): Primary | ICD-10-CM

## 2023-04-25 NOTE — TELEPHONE ENCOUNTER
Scheduled   Future Appointments   Date Time Provider Shan Charito   4/28/2023  3:30 PM Tamanna Allred, DO [facility]   5/2/2023 11:00 AM Kelsey Caldwell MD [facility] 4   6/9/2023  7:00 AM Tamanna Allred DO [facility]

## 2023-04-26 ENCOUNTER — OFFICE VISIT (OUTPATIENT)
Dept: INTERNAL MEDICINE CLINIC | Facility: CLINIC | Age: 63
End: 2023-04-26
Payer: COMMERCIAL

## 2023-04-26 ENCOUNTER — TELEPHONE (OUTPATIENT)
Dept: INTERNAL MEDICINE CLINIC | Facility: CLINIC | Age: 63
End: 2023-04-26

## 2023-04-26 VITALS
SYSTOLIC BLOOD PRESSURE: 140 MMHG | DIASTOLIC BLOOD PRESSURE: 80 MMHG | OXYGEN SATURATION: 90 % | HEIGHT: 68.5 IN | BODY MASS INDEX: 32.99 KG/M2 | HEART RATE: 119 BPM | WEIGHT: 220.19 LBS

## 2023-04-26 DIAGNOSIS — I10 ESSENTIAL HYPERTENSION: Primary | ICD-10-CM

## 2023-04-26 DIAGNOSIS — R00.0 TACHYCARDIA: ICD-10-CM

## 2023-04-26 LAB
ATRIAL RATE: 110 BPM
P AXIS: 59 DEGREES
P-R INTERVAL: 170 MS
Q-T INTERVAL: 346 MS
QRS DURATION: 88 MS
QTC CALCULATION (BEZET): 468 MS
R AXIS: 79 DEGREES
T AXIS: 58 DEGREES
VENTRICULAR RATE: 110 BPM

## 2023-04-26 PROCEDURE — 99214 OFFICE O/P EST MOD 30 MIN: CPT | Performed by: FAMILY MEDICINE

## 2023-04-26 PROCEDURE — 3008F BODY MASS INDEX DOCD: CPT | Performed by: FAMILY MEDICINE

## 2023-04-26 PROCEDURE — 3077F SYST BP >= 140 MM HG: CPT | Performed by: FAMILY MEDICINE

## 2023-04-26 PROCEDURE — 3079F DIAST BP 80-89 MM HG: CPT | Performed by: FAMILY MEDICINE

## 2023-04-26 PROCEDURE — 93000 ELECTROCARDIOGRAM COMPLETE: CPT | Performed by: FAMILY MEDICINE

## 2023-04-26 RX ORDER — METOPROLOL SUCCINATE 50 MG/1
50 TABLET, EXTENDED RELEASE ORAL DAILY
Qty: 90 TABLET | Refills: 1 | Status: SHIPPED | OUTPATIENT
Start: 2023-04-26

## 2023-04-26 NOTE — TELEPHONE ENCOUNTER
Pt states he saw the Cardiologist 10 days ago and everything was fine. Yesterday, he felt a little off, \"maybe a little foggy\". He states his blood pressure today was elevated at 156/88. During this time, he has had intermittent, mild headaches to frontal lobe, rates 4/10 at highest. Denies cp, sob, lightheadedness, dizziness, visual disturbance, unilateral weakness, confusion, slurred speech, or any other sx. Offered appt today. He doesn't want to deal with traffic. He wants to keep the Friday appt. He wants us to adjust his blood pressure medication so that it's better by the time he comes on Friday. Advised we would need to evaluate him be sure there is not an underlying cause of symptoms and it's unlikely to increase medication from one high reading anyway. He said I knew you guys would give me a problem with this. Informed him we are trying to help him and again offered today's appt. Declines again. ED warnings given. AMS, do you want pt to be seen today elsewhere? Or keep appt for Friday?

## 2023-05-02 ENCOUNTER — PROCEDURE (OUTPATIENT)
Dept: SURGERY | Facility: CLINIC | Age: 63
End: 2023-05-02

## 2023-05-02 VITALS — HEART RATE: 112 BPM | DIASTOLIC BLOOD PRESSURE: 97 MMHG | TEMPERATURE: 97 F | SYSTOLIC BLOOD PRESSURE: 164 MMHG

## 2023-05-02 DIAGNOSIS — C67.9 MALIGNANT NEOPLASM OF URINARY BLADDER, UNSPECIFIED SITE (HCC): Primary | ICD-10-CM

## 2023-05-02 LAB
APPEARANCE: CLEAR
BILIRUBIN: NEGATIVE
GLUCOSE (URINE DIPSTICK): NEGATIVE MG/DL
KETONES (URINE DIPSTICK): NEGATIVE MG/DL
LEUKOCYTES: NEGATIVE
MULTISTIX LOT#: ABNORMAL NUMERIC
NITRITE, URINE: NEGATIVE
PH, URINE: 5.5 (ref 4.5–8)
PROTEIN (URINE DIPSTICK): NEGATIVE MG/DL
SPECIFIC GRAVITY: <=1.005 (ref 1–1.03)
URINE-COLOR: YELLOW
UROBILINOGEN,SEMI-QN: 0.2 MG/DL (ref 0–1.9)

## 2023-05-02 PROCEDURE — 52000 CYSTOURETHROSCOPY: CPT | Performed by: SURGERY

## 2023-05-02 PROCEDURE — 3080F DIAST BP >= 90 MM HG: CPT | Performed by: SURGERY

## 2023-05-02 PROCEDURE — 81003 URINALYSIS AUTO W/O SCOPE: CPT | Performed by: SURGERY

## 2023-05-02 PROCEDURE — 3077F SYST BP >= 140 MM HG: CPT | Performed by: SURGERY

## 2023-05-02 RX ORDER — CIPROFLOXACIN 500 MG/1
500 TABLET, FILM COATED ORAL ONCE
Status: COMPLETED | OUTPATIENT
Start: 2023-05-02 | End: 2023-05-02

## 2023-05-02 RX ORDER — SERTRALINE HYDROCHLORIDE 25 MG/1
50 TABLET, FILM COATED ORAL DAILY
Qty: 180 TABLET | Refills: 1 | Status: SHIPPED | OUTPATIENT
Start: 2023-05-02 | End: 2023-05-02

## 2023-05-02 RX ADMIN — CIPROFLOXACIN 500 MG: 500 TABLET, FILM COATED ORAL at 11:50:00

## 2023-06-01 ENCOUNTER — TELEPHONE (OUTPATIENT)
Dept: INTERNAL MEDICINE CLINIC | Facility: CLINIC | Age: 63
End: 2023-06-01

## 2023-06-01 DIAGNOSIS — Z13.29 SCREENING FOR THYROID DISORDER: ICD-10-CM

## 2023-06-01 DIAGNOSIS — Z00.00 ROUTINE GENERAL MEDICAL EXAMINATION AT A HEALTH CARE FACILITY: Primary | ICD-10-CM

## 2023-06-01 DIAGNOSIS — Z13.0 SCREENING FOR DISORDER OF BLOOD AND BLOOD-FORMING ORGANS: ICD-10-CM

## 2023-06-01 DIAGNOSIS — Z12.5 SCREENING FOR MALIGNANT NEOPLASM OF PROSTATE: ICD-10-CM

## 2023-06-01 DIAGNOSIS — R73.03 PREDIABETES: ICD-10-CM

## 2023-06-01 DIAGNOSIS — Z13.228 SCREENING FOR METABOLIC DISORDER: ICD-10-CM

## 2023-06-01 DIAGNOSIS — Z13.220 SCREENING FOR LIPID DISORDERS: ICD-10-CM

## 2023-06-01 DIAGNOSIS — Z87.39 HISTORY OF GOUT: ICD-10-CM

## 2023-06-01 NOTE — TELEPHONE ENCOUNTER
Pt stated he's going to get labs done tomorrow for Lipid and wants to know if AMS can put in a PSA lab for him as well.   High TE    Future Appointments   Date Time Provider Shan Mcneill   6/9/2023  7:00 AM Delgado Salgado,  [facility]

## 2023-06-02 ENCOUNTER — LAB ENCOUNTER (OUTPATIENT)
Dept: LAB | Age: 63
End: 2023-06-02
Attending: FAMILY MEDICINE
Payer: COMMERCIAL

## 2023-06-02 DIAGNOSIS — Z87.39 HISTORY OF GOUT: ICD-10-CM

## 2023-06-02 DIAGNOSIS — Z00.00 ROUTINE GENERAL MEDICAL EXAMINATION AT A HEALTH CARE FACILITY: ICD-10-CM

## 2023-06-02 DIAGNOSIS — R73.03 PREDIABETES: ICD-10-CM

## 2023-06-02 DIAGNOSIS — Z13.220 SCREENING FOR LIPID DISORDERS: ICD-10-CM

## 2023-06-02 DIAGNOSIS — Z12.5 SCREENING FOR MALIGNANT NEOPLASM OF PROSTATE: ICD-10-CM

## 2023-06-02 DIAGNOSIS — Z13.228 SCREENING FOR METABOLIC DISORDER: ICD-10-CM

## 2023-06-02 DIAGNOSIS — Z13.0 SCREENING FOR DISORDER OF BLOOD AND BLOOD-FORMING ORGANS: ICD-10-CM

## 2023-06-02 LAB
ALBUMIN SERPL-MCNC: 4 G/DL (ref 3.4–5)
ALBUMIN/GLOB SERPL: 1.2 {RATIO} (ref 1–2)
ALP LIVER SERPL-CCNC: 83 U/L
ALT SERPL-CCNC: 53 U/L
ANION GAP SERPL CALC-SCNC: 6 MMOL/L (ref 0–18)
AST SERPL-CCNC: 33 U/L (ref 15–37)
BASOPHILS # BLD AUTO: 0.07 X10(3) UL (ref 0–0.2)
BASOPHILS NFR BLD AUTO: 1.2 %
BILIRUB SERPL-MCNC: 0.4 MG/DL (ref 0.1–2)
BUN BLD-MCNC: 14 MG/DL (ref 7–18)
CALCIUM BLD-MCNC: 9.6 MG/DL (ref 8.5–10.1)
CHLORIDE SERPL-SCNC: 102 MMOL/L (ref 98–112)
CHOLEST SERPL-MCNC: 148 MG/DL (ref ?–200)
CO2 SERPL-SCNC: 30 MMOL/L (ref 21–32)
COMPLEXED PSA SERPL-MCNC: 0.7 NG/ML (ref ?–4)
CREAT BLD-MCNC: 0.72 MG/DL
EOSINOPHIL # BLD AUTO: 0.23 X10(3) UL (ref 0–0.7)
EOSINOPHIL NFR BLD AUTO: 3.9 %
ERYTHROCYTE [DISTWIDTH] IN BLOOD BY AUTOMATED COUNT: 12.4 %
EST. AVERAGE GLUCOSE BLD GHB EST-MCNC: 117 MG/DL (ref 68–126)
FASTING PATIENT LIPID ANSWER: YES
FASTING STATUS PATIENT QL REPORTED: YES
GFR SERPLBLD BASED ON 1.73 SQ M-ARVRAT: 105 ML/MIN/1.73M2 (ref 60–?)
GLOBULIN PLAS-MCNC: 3.4 G/DL (ref 2.8–4.4)
GLUCOSE BLD-MCNC: 109 MG/DL (ref 70–99)
HBA1C MFR BLD: 5.7 % (ref ?–5.7)
HCT VFR BLD AUTO: 45.9 %
HDLC SERPL-MCNC: 51 MG/DL (ref 40–59)
HGB BLD-MCNC: 15.8 G/DL
IMM GRANULOCYTES # BLD AUTO: 0.04 X10(3) UL (ref 0–1)
IMM GRANULOCYTES NFR BLD: 0.7 %
LDLC SERPL CALC-MCNC: 69 MG/DL (ref ?–100)
LYMPHOCYTES # BLD AUTO: 2.64 X10(3) UL (ref 1–4)
LYMPHOCYTES NFR BLD AUTO: 44.7 %
MCH RBC QN AUTO: 34.8 PG (ref 26–34)
MCHC RBC AUTO-ENTMCNC: 34.4 G/DL (ref 31–37)
MCV RBC AUTO: 101.1 FL
MONOCYTES # BLD AUTO: 0.63 X10(3) UL (ref 0.1–1)
MONOCYTES NFR BLD AUTO: 10.7 %
NEUTROPHILS # BLD AUTO: 2.29 X10 (3) UL (ref 1.5–7.7)
NEUTROPHILS # BLD AUTO: 2.29 X10(3) UL (ref 1.5–7.7)
NEUTROPHILS NFR BLD AUTO: 38.8 %
NONHDLC SERPL-MCNC: 97 MG/DL (ref ?–130)
OSMOLALITY SERPL CALC.SUM OF ELEC: 287 MOSM/KG (ref 275–295)
PLATELET # BLD AUTO: 265 10(3)UL (ref 150–450)
POTASSIUM SERPL-SCNC: 3.5 MMOL/L (ref 3.5–5.1)
PROT SERPL-MCNC: 7.4 G/DL (ref 6.4–8.2)
RBC # BLD AUTO: 4.54 X10(6)UL
SODIUM SERPL-SCNC: 138 MMOL/L (ref 136–145)
TRIGL SERPL-MCNC: 168 MG/DL (ref 30–149)
URATE SERPL-MCNC: 5.9 MG/DL
VLDLC SERPL CALC-MCNC: 25 MG/DL (ref 0–30)
WBC # BLD AUTO: 5.9 X10(3) UL (ref 4–11)

## 2023-06-02 PROCEDURE — 83036 HEMOGLOBIN GLYCOSYLATED A1C: CPT

## 2023-06-02 PROCEDURE — 80061 LIPID PANEL: CPT

## 2023-06-02 PROCEDURE — 84550 ASSAY OF BLOOD/URIC ACID: CPT

## 2023-06-02 PROCEDURE — 80053 COMPREHEN METABOLIC PANEL: CPT

## 2023-06-02 PROCEDURE — 85025 COMPLETE CBC W/AUTO DIFF WBC: CPT

## 2023-06-02 PROCEDURE — 36415 COLL VENOUS BLD VENIPUNCTURE: CPT

## 2023-06-09 ENCOUNTER — OFFICE VISIT (OUTPATIENT)
Dept: INTERNAL MEDICINE CLINIC | Facility: CLINIC | Age: 63
End: 2023-06-09
Payer: COMMERCIAL

## 2023-06-09 VITALS
SYSTOLIC BLOOD PRESSURE: 140 MMHG | HEIGHT: 68.5 IN | OXYGEN SATURATION: 97 % | WEIGHT: 214 LBS | DIASTOLIC BLOOD PRESSURE: 76 MMHG | BODY MASS INDEX: 32.06 KG/M2 | RESPIRATION RATE: 17 BRPM | HEART RATE: 84 BPM

## 2023-06-09 DIAGNOSIS — Z00.00 ANNUAL PHYSICAL EXAM: Primary | ICD-10-CM

## 2023-06-09 DIAGNOSIS — I10 ESSENTIAL HYPERTENSION: ICD-10-CM

## 2023-06-09 DIAGNOSIS — E78.1 HYPERTRIGLYCERIDEMIA: Chronic | ICD-10-CM

## 2023-06-09 DIAGNOSIS — R73.03 PREDIABETES: ICD-10-CM

## 2023-06-09 PROCEDURE — 3077F SYST BP >= 140 MM HG: CPT | Performed by: FAMILY MEDICINE

## 2023-06-09 PROCEDURE — 99396 PREV VISIT EST AGE 40-64: CPT | Performed by: FAMILY MEDICINE

## 2023-06-09 PROCEDURE — 3078F DIAST BP <80 MM HG: CPT | Performed by: FAMILY MEDICINE

## 2023-06-09 PROCEDURE — 3008F BODY MASS INDEX DOCD: CPT | Performed by: FAMILY MEDICINE

## 2023-06-09 RX ORDER — SERTRALINE HYDROCHLORIDE 25 MG/1
25 TABLET, FILM COATED ORAL DAILY
COMMUNITY

## 2023-06-09 RX ORDER — LOSARTAN POTASSIUM 100 MG/1
100 TABLET ORAL DAILY
Qty: 90 TABLET | Refills: 1 | Status: SHIPPED | OUTPATIENT
Start: 2023-06-09

## 2023-06-16 DIAGNOSIS — Z12.83 SCREENING EXAM FOR SKIN CANCER: Primary | ICD-10-CM

## 2023-07-10 RX ORDER — ROSUVASTATIN CALCIUM 10 MG/1
10 TABLET, COATED ORAL DAILY
Qty: 90 TABLET | Refills: 2 | Status: SHIPPED | OUTPATIENT
Start: 2023-07-10

## 2023-07-10 RX ORDER — HYDROCHLOROTHIAZIDE 25 MG/1
25 TABLET ORAL DAILY
Qty: 90 TABLET | Refills: 0 | Status: SHIPPED | OUTPATIENT
Start: 2023-07-10

## 2023-07-10 RX ORDER — LOSARTAN POTASSIUM 100 MG/1
100 TABLET ORAL DAILY
Qty: 90 TABLET | Refills: 0 | Status: SHIPPED | OUTPATIENT
Start: 2023-07-10

## 2023-07-10 NOTE — TELEPHONE ENCOUNTER
Last OV 6/9/23 annual   Last PE 6/9/23  Last REFILL   rosuvastatin 10 MG Oral Tab 90 tablet 2 8/24/2022     Last LABS Lipid cbc cmp 6/2/23    No future appointments. Per PROTOCOL    Cholesterol Medication Protocol Rdkmac5307/09/2023 09:23 AM   Protocol Details ALT < 80    ALT resulted within past year    Lipid panel within past 12 months    Appointment within past 12 or next 3 months        Please Advise?

## 2023-07-10 NOTE — TELEPHONE ENCOUNTER
Last OV 6/9/23    Last PE 6/9/23   Last REFILL    losartan 100 MG Oral Tab 90 tablet 1 6/9/2023     hydroCHLOROthiazide 25 MG Oral Tab 90 tablet 0 4/5/2023     Last LABS cbc lipid cmp 6/2/23     No future appointments. Per PROTOCOL?     Hypertension Medications Protocol Softiq72/10/2023 12:24 AM   Protocol Details CMP or BMP in past 12 months    Last serum creatinine< 2.0    Appointment in past 6 or next 3 months          Hypertension Medications Protocol Xrtdug08/10/2023 12:24 AM   Protocol Details CMP or BMP in past 12 months    Last serum creatinine< 2.0    Appointment in past 6 or next 3 months          Please Advise

## 2023-08-08 ENCOUNTER — HOSPITAL ENCOUNTER (OUTPATIENT)
Dept: ULTRASOUND IMAGING | Age: 63
Discharge: HOME | End: 2023-08-08
Attending: FAMILY MEDICINE
Payer: COMMERCIAL

## 2023-08-08 DIAGNOSIS — I65.23 BILATERAL CAROTID ARTERY STENOSIS: ICD-10-CM

## 2023-08-08 PROCEDURE — 93880 EXTRACRANIAL BILAT STUDY: CPT | Performed by: FAMILY MEDICINE

## 2023-09-05 ENCOUNTER — HOSPITAL ENCOUNTER (OUTPATIENT)
Dept: CT IMAGING | Facility: HOSPITAL | Age: 63
Discharge: HOME | End: 2023-09-05
Attending: SURGERY
Payer: COMMERCIAL

## 2023-09-05 DIAGNOSIS — C67.9 MALIGNANT NEOPLASM OF URINARY BLADDER, UNSPECIFIED SITE (HCC): ICD-10-CM

## 2023-09-05 LAB
CREAT BLD-MCNC: 0.8 MG/DL
EGFRCR SERPLBLD CKD-EPI 2021: 101 ML/MIN/1.73M2 (ref 60–?)

## 2023-09-05 PROCEDURE — 76377 3D RENDER W/INTRP POSTPROCES: CPT | Performed by: SURGERY

## 2023-09-05 PROCEDURE — 74178 CT ABD&PLV WO CNTR FLWD CNTR: CPT | Performed by: SURGERY

## 2023-09-05 PROCEDURE — 82565 ASSAY OF CREATININE: CPT

## 2023-09-06 NOTE — PROGRESS NOTES
Can you setup for cysto with me next available? Normal CT urogram 9/5/23. Needs another surveillance cysto, last was normal on 5/2/23.

## 2023-09-11 ENCOUNTER — TELEPHONE (OUTPATIENT)
Dept: SURGERY | Facility: CLINIC | Age: 63
End: 2023-09-11

## 2023-09-11 RX ORDER — AMLODIPINE BESYLATE 5 MG/1
5 TABLET ORAL DAILY
Qty: 90 TABLET | Refills: 0 | Status: SHIPPED | OUTPATIENT
Start: 2023-09-11

## 2023-09-11 NOTE — TELEPHONE ENCOUNTER
Last OV 6/9/23   Last PE 6/9/23  Last REFILL   AMLODIPINE 5 MG Oral Tab 90 tablet 0 11/30/2022     Last LABS cmp cbc lipid 6/2/23     Future Appointments   Date Time Provider Shan Mcneill   10/5/2023  8:00 AM Sean Sanders MD [facility] 4         Per PROTOCOL?     Hypertension Medications Protocol Naanen6909/09/2023 07:36 AM   Protocol Details CMP or BMP in past 12 months    Last serum creatinine< 2.0    Appointment in past 6 or next 3 months        Please Advise

## 2023-09-11 NOTE — TELEPHONE ENCOUNTER
Appt has been moved to 10/5/23 due to the need for more time to do a procedure. Tried to call patient at home #, and cell. Unable to reach. [facility] sent.

## 2023-10-05 ENCOUNTER — PROCEDURE (OUTPATIENT)
Dept: SURGERY | Facility: CLINIC | Age: 63
End: 2023-10-05

## 2023-10-05 VITALS — DIASTOLIC BLOOD PRESSURE: 91 MMHG | SYSTOLIC BLOOD PRESSURE: 159 MMHG

## 2023-10-05 DIAGNOSIS — R82.90 URINE FINDING: Primary | ICD-10-CM

## 2023-10-05 LAB
APPEARANCE: CLEAR
BILIRUBIN: NEGATIVE
GLUCOSE (URINE DIPSTICK): NEGATIVE MG/DL
LEUKOCYTES: NEGATIVE
MULTISTIX LOT#: ABNORMAL NUMERIC
NITRITE, URINE: NEGATIVE
PH, URINE: 5.5 (ref 4.5–8)
PROTEIN (URINE DIPSTICK): 30 MG/DL
SPECIFIC GRAVITY: 1.02 (ref 1–1.03)
URINE-COLOR: YELLOW
UROBILINOGEN,SEMI-QN: 0.2 MG/DL (ref 0–1.9)

## 2023-10-05 PROCEDURE — 52000 CYSTOURETHROSCOPY: CPT | Performed by: SURGERY

## 2023-10-05 PROCEDURE — 3080F DIAST BP >= 90 MM HG: CPT | Performed by: SURGERY

## 2023-10-05 PROCEDURE — 3077F SYST BP >= 140 MM HG: CPT | Performed by: SURGERY

## 2023-10-05 PROCEDURE — 81003 URINALYSIS AUTO W/O SCOPE: CPT | Performed by: SURGERY

## 2023-10-05 RX ORDER — CIPROFLOXACIN 500 MG/1
500 TABLET, FILM COATED ORAL ONCE
Status: COMPLETED | OUTPATIENT
Start: 2023-10-05 | End: 2023-10-05

## 2023-10-05 RX ADMIN — CIPROFLOXACIN 500 MG: 500 TABLET, FILM COATED ORAL at 09:09:00

## 2023-10-05 NOTE — PROCEDURES
Clinic Procedure Note    INDICATIONS:   Wilfredo Albarran is a 63year old male with history of GERD, gout, hypertension, bladder cancer. He was previously followed by [facility] urology and Dr. Henrietta Petersen, last seen on 7/6/2022. On that date he was taken to the operating room for cystoscopy, urethral dilation of bulbar stricture, TURBT of a posterior wall bladder mass (pathology benign). Back in July 2021 he had another TURBT of a greater than 3 cm posterior wall bladder mass showing HGTa urothelial carcinoma. Prior to this, he was initially diagnosed with bladder cancer back in 2001. He had 2 cycles of BCG followed by multiple negative cystoscopies. He had not had a cystoscopy for some time, but then an episode of gross hematuria in 2021 prompted repeat work-up. He also notes that his last cystoscopy in November 2022 was normal.  In July 2022 he had a clinic cystoscopy and bladder biopsy that was benign. Back in 2021 after his initial diagnosis of this recurrence, he underwent 1 dose of BCG but did not tolerate it with significant joint symptoms and weakness. He denies recent gross hematuria. He has mild weak urinary stream that is well controlled on tamsulosin. He transferred his care to me and I performed surveillance cystoscopy on 5/2/2023 that was normal.  I also ordered a CT urogram since he had not had upper tract imaging in a while and this was normal as well. At his last visit we discussed intravesical gemcitabine treatments but he plans to think about it. Initial Diagnosis: 2001, then 7/2021 recurrence  Pathology: HGTa  Multifocal? No  Greater than 3 cm? Yes  Last Recurrence: 2021  AUA Risk Category: High   Last upper tract imaging: Normal CT urogram 9/5/2023  Intravesical therapy: BCG x 1 dose in 2021 (did not tolerate)    PROCEDURE:       1.  Flexible cystourethroscopy    DATE OF PROCEDURE: 10/5/2023     PRE-PROCEDURE DIAGNOSIS: Bladder cancer    POST-PROCEDURE DIAGNOSIS: Same SURGEON: Ernie Paula MD    FINDINGS:    Urethra: Orthotopic meatus, normal caliber urethra throughout without lesions    Prostate: Mildly enlarged without signs of obstruction, mildly high bladder neck, minimal intravesical protrusion    Bladder: Normal mucosa with no papillary lesions or erythema, minimal trabeculation, well-healed posterior midline bladder scar    Ureteral orifices: Orthotopic    Other findings: None    PROCEDURE:   Patient was brought to the procedure suite and a time-out was performed identifiying the patient,  and procedure to be performed. The risks and benefits of the procedure were once again discussed with the patient including bleeding, infection, and dysuria. The patient agreed to proceed. The patient did not have any signs or symptoms of active UTI. He was placed in supine position on the table and the penis was prepped and draped in the standard sterile fashion. Wilnette Merl was instilled per urethra for local anesthetic effect. A flexible cystoscope was inserted per urethra. The bladder was fully inspected (including retroflexion) and showed findings as above. Both ureteral orifices were orthotopic. The prostate was carefully viewed on removal of the scope, with findings as above. The scope was then carefully removed. There were no complications and the patient tolerated the procedure well. IMPRESSION:  Allean Schirmer is a 63year old male with history of GERD, gout, hypertension, bladder cancer. He was previously followed by [facility] urology and Dr. Michaela Cheek, last seen on 2022. On that date he was taken to the operating room for cystoscopy, urethral dilation of bulbar stricture, TURBT of a posterior wall bladder mass (pathology benign). Back in 2021 he had another TURBT of a greater than 3 cm posterior wall bladder mass showing HGTa urothelial carcinoma. Prior to this, he was initially diagnosed with bladder cancer back in .   He had 2 cycles of BCG followed by multiple negative cystoscopies. He had not had a cystoscopy for some time, but then an episode of gross hematuria in 2021 prompted repeat work-up. He also notes that his last cystoscopy in November 2022 was normal.  In July 2022 he had a clinic cystoscopy and bladder biopsy that was benign. Back in 2021 after his initial diagnosis of this recurrence, he underwent 1 dose of BCG but did not tolerate it with significant joint symptoms and weakness. He denies recent gross hematuria. He has mild weak urinary stream that is well controlled on tamsulosin. He transferred his care to me and I performed surveillance cystoscopy on 5/2/2023 that was normal.  I also ordered a CT urogram since he had not had upper tract imaging in a while and this was normal as well. At his last visit we discussed intravesical gemcitabine treatments but he plans to think about it. Initial Diagnosis: 2001, then 7/2021 recurrence  Pathology: HGTa  Multifocal? No  Greater than 3 cm? Yes  Last Recurrence: 2021  AUA Risk Category: High   Last upper tract imaging: Normal CT urogram 9/5/2023  Intravesical therapy: BCG x 1 dose in 2021 (did not tolerate)    Normal cystoscopy today. PLAN:   -Follow-up urine cytology  -Next surveillance cystoscopy in 6 months              Chase Seo MD  Staff Urologist  [place] [address]  Office: [phone]

## 2023-10-06 LAB — NON GYNE INTERPRETATION: NEGATIVE

## 2023-10-06 NOTE — PROGRESS NOTES
Gabi Grimm,    I have reviewed your urine test results. Tests show no significant abnormalities (no signs of microscopic cancer cells in the urine). No changes to the plan as we had previously discussed. Please let me know if you have any questions.     Thanks,  Ventura Mansfield MD

## 2023-11-21 ENCOUNTER — IMMUNIZATION (OUTPATIENT)
Dept: INTERNAL MEDICINE CLINIC | Facility: CLINIC | Age: 63
End: 2023-11-21
Payer: COMMERCIAL

## 2023-11-21 DIAGNOSIS — Z23 NEED FOR VACCINATION: Primary | ICD-10-CM

## 2023-11-21 PROCEDURE — 90686 IIV4 VACC NO PRSV 0.5 ML IM: CPT | Performed by: FAMILY MEDICINE

## 2023-11-21 PROCEDURE — 90471 IMMUNIZATION ADMIN: CPT | Performed by: FAMILY MEDICINE

## 2023-11-24 PROCEDURE — 88305 TISSUE EXAM BY PATHOLOGIST: CPT | Performed by: STUDENT IN AN ORGANIZED HEALTH CARE EDUCATION/TRAINING PROGRAM

## 2023-12-01 ENCOUNTER — LAB REQUISITION (OUTPATIENT)
Dept: LAB | Facility: HOSPITAL | Age: 63
End: 2023-12-01
Payer: COMMERCIAL

## 2023-12-01 DIAGNOSIS — Z00.00 ENCOUNTER FOR GENERAL ADULT MEDICAL EXAMINATION WITHOUT ABNORMAL FINDINGS: ICD-10-CM

## 2023-12-14 ENCOUNTER — TELEPHONE (OUTPATIENT)
Dept: INTERNAL MEDICINE CLINIC | Facility: CLINIC | Age: 63
End: 2023-12-14

## 2024-02-15 ENCOUNTER — TELEPHONE (OUTPATIENT)
Dept: SURGERY | Facility: CLINIC | Age: 64
End: 2024-02-15

## 2024-02-15 RX ORDER — SERTRALINE HYDROCHLORIDE 25 MG/1
50 TABLET, FILM COATED ORAL DAILY
Qty: 180 TABLET | Refills: 1 | Status: SHIPPED | OUTPATIENT
Start: 2024-02-15

## 2024-02-15 NOTE — TELEPHONE ENCOUNTER
Requested Prescriptions     Pending Prescriptions Disp Refills    SERTRALINE 25 MG Oral Tab [Pharmacy Med Name: SERTRALINE HCL 25 MG TABLET] 180 tablet 1     Sig: TAKE 2 TABLETS BY MOUTH EVERY DAY       LOV:6/9/23 AMS    LAST CPE:6/9/23 AMS    Last Labs:6/2/23 A1c,psa,cmp,cbc,lipid    Last Refill:  Medication Quantity Refills Start End   sertraline 25 MG Oral Tab -- --  --   Sig:   Take 1 tablet (25 mg total) by mouth daily.     Route:   Oral     Class:   Historical     Order #:   [id number]

## 2024-02-15 NOTE — TELEPHONE ENCOUNTER
LMVM advising pt that dr will be in sx on 4/4 so his procedure will need to be rescheduled. Asked pt to call back to reschedule.    Aware that appt has been cancelled

## 2024-02-21 ENCOUNTER — TELEPHONE (OUTPATIENT)
Dept: SURGERY | Facility: CLINIC | Age: 64
End: 2024-02-21

## 2024-03-25 RX ORDER — ALLOPURINOL 300 MG/1
TABLET ORAL
Qty: 90 TABLET | Refills: 0 | Status: SHIPPED | OUTPATIENT
Start: 2024-03-25

## 2024-03-25 NOTE — TELEPHONE ENCOUNTER
Requested Prescriptions     Pending Prescriptions Disp Refills    ALLOPURINOL 300 MG Oral Tab [Pharmacy Med Name: ALLOPURINOL 300 MG TABLET] 90 tablet 3     Sig: TAKE 1 TABLET BY MOUTH EVERY DAY       LOV:6/9/23 AMS    LAST CPE:6/9/23 AMS    Last Labs:6/2/23 A1c,psa,cmp,cbc,lipid    Last Refill:  Medication Quantity Refills Start End   ALLOPURINOL 300 MG Oral Tab 90 tablet 3 10/25/2022 --   Sig:   TAKE 1 TABLET BY MOUTH EVERY DAY     Route:   (none)     Order #:   [id number]       Your appointments       Date & Time Appointment Department ([place])    Apr 09, 2024 10:00 AM CDT Procedure with Chase Oropeza MD [facility] ([place] [address])              [place] [address]  [address]  [facility] [id number] [phone]

## 2024-04-09 ENCOUNTER — PROCEDURE (OUTPATIENT)
Dept: SURGERY | Facility: CLINIC | Age: 64
End: 2024-04-09

## 2024-04-09 VITALS — SYSTOLIC BLOOD PRESSURE: 146 MMHG | DIASTOLIC BLOOD PRESSURE: 77 MMHG

## 2024-04-09 DIAGNOSIS — R82.90 URINE FINDING: Primary | ICD-10-CM

## 2024-04-09 LAB
APPEARANCE: CLEAR
BILIRUBIN: NEGATIVE
GLUCOSE (URINE DIPSTICK): NEGATIVE MG/DL
KETONES (URINE DIPSTICK): NEGATIVE MG/DL
LEUKOCYTES: NEGATIVE
MULTISTIX LOT#: ABNORMAL NUMERIC
NITRITE, URINE: NEGATIVE
PH, URINE: 7 (ref 4.5–8)
PROTEIN (URINE DIPSTICK): NEGATIVE MG/DL
SPECIFIC GRAVITY: 1.01 (ref 1–1.03)
URINE-COLOR: YELLOW
UROBILINOGEN,SEMI-QN: 0.2 MG/DL (ref 0–1.9)

## 2024-04-09 PROCEDURE — 3077F SYST BP >= 140 MM HG: CPT | Performed by: SURGERY

## 2024-04-09 PROCEDURE — 3078F DIAST BP <80 MM HG: CPT | Performed by: SURGERY

## 2024-04-09 PROCEDURE — 81003 URINALYSIS AUTO W/O SCOPE: CPT | Performed by: SURGERY

## 2024-04-09 PROCEDURE — 52000 CYSTOURETHROSCOPY: CPT | Performed by: SURGERY

## 2024-04-09 RX ORDER — TRIAMCINOLONE ACETONIDE 1 MG/G
1 CREAM TOPICAL
COMMUNITY
Start: 2024-02-28

## 2024-04-09 RX ORDER — CIPROFLOXACIN 500 MG/1
500 TABLET, FILM COATED ORAL ONCE
Status: COMPLETED | OUTPATIENT
Start: 2024-04-09 | End: 2024-04-09

## 2024-04-09 RX ADMIN — CIPROFLOXACIN 500 MG: 500 TABLET, FILM COATED ORAL at 09:59:00

## 2024-04-09 NOTE — PROCEDURES
Clinic Procedure Note    INDICATIONS:   Huang Kapoor is a 63 year old male with history of GERD, gout, hypertension, bladder cancer.  He was previously followed by [facility] and Dr. Frederick Cruz, last seen on 7/6/2022.  On that date he was taken to the operating room for cystoscopy, urethral dilation of bulbar stricture, TURBT of a posterior wall bladder mass (pathology benign).  Back in July 2021 he had another TURBT of a greater than 3 cm posterior wall bladder mass showing HGTa urothelial carcinoma.  Prior to this, he was initially diagnosed with bladder cancer back in 2001.  He had 2 cycles of BCG followed by multiple negative cystoscopies.  He had not had a cystoscopy for some time, but then an episode of gross hematuria in 2021 prompted repeat work-up.     He also notes that his last cystoscopy in November 2022 was normal.  In July 2022 he had a clinic cystoscopy and bladder biopsy that was benign.  Back in 2021 after his initial diagnosis of this recurrence, he underwent 1 dose of BCG but did not tolerate it with significant joint symptoms and weakness.  He denies recent gross hematuria.  He has mild weak urinary stream that is well controlled on tamsulosin.     He transferred his care to me and I performed surveillance cystoscopy on 5/2/2023 and 10/5/2023 and these were normal.   I also ordered a CT urogram since he had not had upper tract imaging in a while and this was normal as well.  Urine cytology 10/5/2023 negative.     Initial Diagnosis: 2001, then 7/2021 recurrence  Pathology: HGTa  Multifocal? No  Greater than 3 cm? Yes  Last Recurrence: 2021  AUA Risk Category: High   Last upper tract imaging: Normal CT urogram 9/5/2023  Intravesical therapy: BCG x 1 dose in 2021 (did not tolerate)    PROCEDURE:       1. Flexible cystourethroscopy    DATE OF PROCEDURE: 4/9/2024     PRE-PROCEDURE DIAGNOSIS: Bladder cancer hematuria    POST-PROCEDURE DIAGNOSIS: Same     SURGEON: Chase Oropeza  MD    FINDINGS:    Urethra: Orthotopic meatus, normal caliber urethra throughout without lesions    Prostate: Mildly enlarged without signs of obstruction, mildly high bladder neck, minimal intravesical protrusion    Bladder: Normal mucosa with no papillary lesions or erythema, minimal trabeculation    Ureteral orifices: Orthotopic    Other findings: None    PROCEDURE:   Patient was brought to the procedure suite and a time-out was performed identifiying the patient,  and procedure to be performed. The risks and benefits of the procedure were once again discussed with the patient including bleeding, infection, and dysuria. The patient agreed to proceed. The patient did not have any signs or symptoms of active UTI.    He was placed in supine position on the table and the penis was prepped and draped in the standard sterile fashion. Urojet was instilled per urethra for local anesthetic effect. A flexible cystoscope was inserted per urethra. The bladder was fully inspected (including retroflexion) and showed findings as above. Both ureteral orifices were orthotopic. The prostate was carefully viewed on removal of the scope, with findings as above. The scope was then carefully removed.    There were no complications and the patient tolerated the procedure well.    IMPRESSION:  Huang Kapoor is a 63 year old male with history of GERD, gout, hypertension, bladder cancer.  He was previously followed by [facility] and Dr. Frederick Cruz, last seen on 2022.  On that date he was taken to the operating room for cystoscopy, urethral dilation of bulbar stricture, TURBT of a posterior wall bladder mass (pathology benign).  Back in 2021 he had another TURBT of a greater than 3 cm posterior wall bladder mass showing HGTa urothelial carcinoma.  Prior to this, he was initially diagnosed with bladder cancer back in .  He had 2 cycles of BCG followed by multiple negative cystoscopies.  He had not had a  cystoscopy for some time, but then an episode of gross hematuria in 2021 prompted repeat work-up.     He also notes that his last cystoscopy in November 2022 was normal.  In July 2022 he had a clinic cystoscopy and bladder biopsy that was benign.  Back in 2021 after his initial diagnosis of this recurrence, he underwent 1 dose of BCG but did not tolerate it with significant joint symptoms and weakness.  He denies recent gross hematuria.  He has mild weak urinary stream that is well controlled on tamsulosin.     He transferred his care to me and I performed surveillance cystoscopy on 5/2/2023 and 10/5/2023 and these were normal.   I also ordered a CT urogram since he had not had upper tract imaging in a while and this was normal as well.  Urine cytology 10/5/2023 negative.     Initial Diagnosis: 2001, then 7/2021 recurrence  Pathology: HGTa  Multifocal? No  Greater than 3 cm? Yes  Last Recurrence: 2021  AUA Risk Category: High   Last upper tract imaging: Normal CT urogram 9/5/2023  Intravesical therapy: BCG x 1 dose in 2021 (did not tolerate)     Normal cystoscopy today.      PLAN:   -Follow-up urine cytology  -Repeat cystoscopy in 6 months             Chase Oropeza MD  Staff Urologist  [facility]  Office: [phone]

## 2024-04-10 LAB — NON GYNE INTERPRETATION: NEGATIVE

## 2024-04-10 NOTE — PROGRESS NOTES
Noah Encinas,    I have reviewed your urine test results. Tests show no significant abnormalities (no signs of microscopic cancer cells in the urine). No changes to the plan as we had previously discussed. Please let me know if you have any questions.    Thanks,  Chase Oropeza MD

## 2024-06-21 DIAGNOSIS — E78.1 HYPERTRIGLYCERIDEMIA: ICD-10-CM

## 2024-06-21 DIAGNOSIS — R73.03 PREDIABETES: Primary | ICD-10-CM

## 2024-06-21 DIAGNOSIS — Z12.5 SCREENING FOR MALIGNANT NEOPLASM OF PROSTATE: ICD-10-CM

## 2024-06-21 DIAGNOSIS — Z87.39 HISTORY OF GOUT: ICD-10-CM

## 2024-06-21 DIAGNOSIS — I10 ESSENTIAL HYPERTENSION: ICD-10-CM

## 2024-06-21 DIAGNOSIS — Z13.0 SCREENING FOR DISORDER OF BLOOD AND BLOOD-FORMING ORGANS: ICD-10-CM

## 2024-06-24 RX ORDER — ALLOPURINOL 300 MG/1
TABLET ORAL
Qty: 90 TABLET | Refills: 0 | Status: SHIPPED | OUTPATIENT
Start: 2024-06-24

## 2024-06-28 ENCOUNTER — LAB ENCOUNTER (OUTPATIENT)
Dept: LAB | Age: 64
End: 2024-06-28
Attending: FAMILY MEDICINE

## 2024-06-28 DIAGNOSIS — E78.1 HYPERTRIGLYCERIDEMIA: ICD-10-CM

## 2024-06-28 DIAGNOSIS — Z12.5 SCREENING FOR MALIGNANT NEOPLASM OF PROSTATE: ICD-10-CM

## 2024-06-28 DIAGNOSIS — I10 ESSENTIAL HYPERTENSION: ICD-10-CM

## 2024-06-28 DIAGNOSIS — R73.03 PREDIABETES: ICD-10-CM

## 2024-06-28 DIAGNOSIS — Z13.0 SCREENING FOR DISORDER OF BLOOD AND BLOOD-FORMING ORGANS: ICD-10-CM

## 2024-06-28 DIAGNOSIS — Z87.39 HISTORY OF GOUT: ICD-10-CM

## 2024-06-28 LAB
ALBUMIN SERPL-MCNC: 3.8 G/DL (ref 3.4–5)
ALBUMIN/GLOB SERPL: 1.1 {RATIO} (ref 1–2)
ALP LIVER SERPL-CCNC: 88 U/L
ALT SERPL-CCNC: 43 U/L
ANION GAP SERPL CALC-SCNC: 7 MMOL/L (ref 0–18)
AST SERPL-CCNC: 22 U/L (ref 15–37)
BASOPHILS # BLD AUTO: 0.06 X10(3) UL (ref 0–0.2)
BASOPHILS NFR BLD AUTO: 0.9 %
BILIRUB SERPL-MCNC: 0.4 MG/DL (ref 0.1–2)
BUN BLD-MCNC: 21 MG/DL (ref 9–23)
CALCIUM BLD-MCNC: 9.2 MG/DL (ref 8.5–10.1)
CHLORIDE SERPL-SCNC: 104 MMOL/L (ref 98–112)
CHOLEST SERPL-MCNC: 156 MG/DL (ref ?–200)
CO2 SERPL-SCNC: 28 MMOL/L (ref 21–32)
COMPLEXED PSA SERPL-MCNC: 0.58 NG/ML (ref ?–4)
CREAT BLD-MCNC: 0.82 MG/DL
EGFRCR SERPLBLD CKD-EPI 2021: 100 ML/MIN/1.73M2 (ref 60–?)
EOSINOPHIL # BLD AUTO: 0.27 X10(3) UL (ref 0–0.7)
EOSINOPHIL NFR BLD AUTO: 4 %
ERYTHROCYTE [DISTWIDTH] IN BLOOD BY AUTOMATED COUNT: 11.9 %
EST. AVERAGE GLUCOSE BLD GHB EST-MCNC: 126 MG/DL (ref 68–126)
FASTING PATIENT LIPID ANSWER: YES
FASTING STATUS PATIENT QL REPORTED: YES
GLOBULIN PLAS-MCNC: 3.4 G/DL (ref 2.8–4.4)
GLUCOSE BLD-MCNC: 123 MG/DL (ref 70–99)
HBA1C MFR BLD: 6 % (ref ?–5.7)
HCT VFR BLD AUTO: 44.6 %
HDLC SERPL-MCNC: 47 MG/DL (ref 40–59)
HGB BLD-MCNC: 15.7 G/DL
IMM GRANULOCYTES # BLD AUTO: 0.03 X10(3) UL (ref 0–1)
IMM GRANULOCYTES NFR BLD: 0.4 %
LDLC SERPL CALC-MCNC: 75 MG/DL (ref ?–100)
LYMPHOCYTES # BLD AUTO: 3.02 X10(3) UL (ref 1–4)
LYMPHOCYTES NFR BLD AUTO: 44.3 %
MCH RBC QN AUTO: 34.5 PG (ref 26–34)
MCHC RBC AUTO-ENTMCNC: 35.2 G/DL (ref 31–37)
MCV RBC AUTO: 98 FL
MONOCYTES # BLD AUTO: 0.72 X10(3) UL (ref 0.1–1)
MONOCYTES NFR BLD AUTO: 10.6 %
NEUTROPHILS # BLD AUTO: 2.71 X10 (3) UL (ref 1.5–7.7)
NEUTROPHILS # BLD AUTO: 2.71 X10(3) UL (ref 1.5–7.7)
NEUTROPHILS NFR BLD AUTO: 39.8 %
NONHDLC SERPL-MCNC: 109 MG/DL (ref ?–130)
OSMOLALITY SERPL CALC.SUM OF ELEC: 292 MOSM/KG (ref 275–295)
PLATELET # BLD AUTO: 262 10(3)UL (ref 150–450)
POTASSIUM SERPL-SCNC: 3.8 MMOL/L (ref 3.5–5.1)
PROT SERPL-MCNC: 7.2 G/DL (ref 6.4–8.2)
RBC # BLD AUTO: 4.55 X10(6)UL
SODIUM SERPL-SCNC: 139 MMOL/L (ref 136–145)
TRIGL SERPL-MCNC: 202 MG/DL (ref 30–149)
URATE SERPL-MCNC: 5.5 MG/DL
VLDLC SERPL CALC-MCNC: 31 MG/DL (ref 0–30)
WBC # BLD AUTO: 6.8 X10(3) UL (ref 4–11)

## 2024-06-28 PROCEDURE — 85025 COMPLETE CBC W/AUTO DIFF WBC: CPT

## 2024-06-28 PROCEDURE — 36415 COLL VENOUS BLD VENIPUNCTURE: CPT

## 2024-06-28 PROCEDURE — 80053 COMPREHEN METABOLIC PANEL: CPT

## 2024-06-28 PROCEDURE — 83036 HEMOGLOBIN GLYCOSYLATED A1C: CPT

## 2024-06-28 PROCEDURE — 80061 LIPID PANEL: CPT

## 2024-06-28 PROCEDURE — 84550 ASSAY OF BLOOD/URIC ACID: CPT

## 2024-07-01 ENCOUNTER — TELEPHONE (OUTPATIENT)
Dept: INTERNAL MEDICINE CLINIC | Facility: CLINIC | Age: 64
End: 2024-07-01

## 2024-07-01 NOTE — TELEPHONE ENCOUNTER
Please call patient to schedule annual check-up. He already did his labs so we will discuss results at his visit.

## 2024-07-03 NOTE — TELEPHONE ENCOUNTER
Future Appointments   Date Time Provider Department Center   7/16/2024  2:30 PM Page Swartz,  [facility]

## 2024-07-16 ENCOUNTER — OFFICE VISIT (OUTPATIENT)
Dept: INTERNAL MEDICINE CLINIC | Facility: CLINIC | Age: 64
End: 2024-07-16
Payer: COMMERCIAL

## 2024-07-16 VITALS
HEIGHT: 68 IN | OXYGEN SATURATION: 94 % | HEART RATE: 97 BPM | WEIGHT: 210.81 LBS | SYSTOLIC BLOOD PRESSURE: 128 MMHG | DIASTOLIC BLOOD PRESSURE: 74 MMHG | BODY MASS INDEX: 31.95 KG/M2

## 2024-07-16 DIAGNOSIS — C67.9 MALIGNANT NEOPLASM OF URINARY BLADDER, UNSPECIFIED SITE (HCC): ICD-10-CM

## 2024-07-16 DIAGNOSIS — Z00.00 ANNUAL PHYSICAL EXAM: Primary | ICD-10-CM

## 2024-07-16 DIAGNOSIS — R73.03 PREDIABETES: ICD-10-CM

## 2024-07-16 DIAGNOSIS — E78.1 HYPERTRIGLYCERIDEMIA: ICD-10-CM

## 2024-07-16 DIAGNOSIS — Z87.39 HISTORY OF GOUT: ICD-10-CM

## 2024-07-16 DIAGNOSIS — I10 ESSENTIAL HYPERTENSION: ICD-10-CM

## 2024-07-16 PROCEDURE — 99396 PREV VISIT EST AGE 40-64: CPT | Performed by: FAMILY MEDICINE

## 2024-07-16 PROCEDURE — 99214 OFFICE O/P EST MOD 30 MIN: CPT | Performed by: FAMILY MEDICINE

## 2024-07-16 PROCEDURE — 3008F BODY MASS INDEX DOCD: CPT | Performed by: FAMILY MEDICINE

## 2024-07-16 PROCEDURE — 3078F DIAST BP <80 MM HG: CPT | Performed by: FAMILY MEDICINE

## 2024-07-16 PROCEDURE — 3074F SYST BP LT 130 MM HG: CPT | Performed by: FAMILY MEDICINE

## 2024-07-18 NOTE — PROGRESS NOTES
Subjective:   Patient ID: Huang Kapoor is a 63 year old male.    HPI Here for annual check-up. Patient is not exercising regularly. Taking meds as prescribed with no issues. Sees Cardiology.     History/Other:   Past Medical History:    Acute renal failure (ARF) (HCC)    Bladder cancer (HCC)    Colitis, Clostridium difficile    Esophageal reflux    Gout    Unspecified essential hypertension     History reviewed. No pertinent surgical history.  Social History     Socioeconomic History    Marital status:    Tobacco Use    Smoking status: Former     Current packs/day: 1.50     Average packs/day: 1.5 packs/day for 30.0 years (45.0 ttl pk-yrs)     Types: Cigarettes    Smokeless tobacco: Never   Vaping Use    Vaping status: Never Used   Substance and Sexual Activity    Alcohol use: No    Drug use: No     Social Determinants of Health      Received from [facility], [facility]    Social Connections    Received from [facility], [facility]    Housing Stability     Family History   Problem Relation Age of Onset    Stroke Mother     Colon Polyps Mother     Heart Disease Brother     Heart Attack Brother        Review of Systems   Constitutional:  Negative for chills, fatigue and fever.   HENT:  Negative for hearing loss and sore throat.    Eyes:  Negative for pain and visual disturbance.   Respiratory:  Negative for choking, shortness of breath and wheezing.    Cardiovascular:  Negative for chest pain, palpitations and leg swelling.   Gastrointestinal:  Negative for abdominal pain, constipation, diarrhea, nausea and vomiting.   Endocrine: Negative for polydipsia, polyphagia and polyuria.   Genitourinary:  Negative for dysuria.   Musculoskeletal:  Negative for arthralgias and joint swelling.   Skin:  Negative for rash.   Neurological:  Negative for dizziness, weakness, light-headedness, numbness and headaches.   Hematological:   Negative for adenopathy. Does not bruise/bleed easily.   Psychiatric/Behavioral:  Negative for dysphoric mood. The patient is not nervous/anxious.      Current Outpatient Medications   Medication Sig Dispense Refill    ALLOPURINOL 300 MG Oral Tab TAKE 1 TABLET BY MOUTH EVERY DAY 90 tablet 0    sertraline 25 MG Oral Tab Take 2 tablets (50 mg total) by mouth daily. 180 tablet 1    amLODIPine 5 MG Oral Tab Take 1 tablet (5 mg total) by mouth daily. 90 tablet 0    rosuvastatin 10 MG Oral Tab Take 1 tablet (10 mg total) by mouth daily. 90 tablet 2    HYDROCHLOROTHIAZIDE 25 MG Oral Tab TAKE 1 TABLET (25 MG TOTAL) BY MOUTH DAILY. 90 tablet 0    losartan 100 MG Oral Tab Take 1 tablet (100 mg total) by mouth daily. 90 tablet 0    losartan 100 MG Oral Tab Take 1 tablet (100 mg total) by mouth daily. 90 tablet 1    metoprolol succinate ER 50 MG Oral Tablet 24 Hr Take 1 tablet (50 mg total) by mouth daily. 90 tablet 1    tamsulosin 0.4 MG Oral Cap Take 1 capsule (0.4 mg total) by mouth After dinner. 90 capsule 5    montelukast 10 MG Oral Tab Take 1 tablet (10 mg total) by mouth daily.      triamcinolone 0.1 % External Cream 1 Ring every 28 days. FOR 21 DAYS IN, THEN REMOVE FOR 7 DAYS      ALPRAZolam 0.25 MG Oral Tab TAKE 1-2 TABLETS BY MOUTH DAILY AS NEEDED *LIMIT USE TO NO MORE THAN TWICE WEEKLY (Patient not taking: Reported on 7/16/2024) 30 tablet 0    albuterol 108 (90 Base) MCG/ACT Inhalation Aero Soln Inhale into the lungs every 6 (six) hours as needed for Wheezing.       Allergies:  Allergies   Allergen Reactions    Sulfa Antibiotics SWELLING    Pcn [Penicillins]        Objective:   Physical Exam  Vitals reviewed.   Constitutional:       Appearance: Normal appearance. He is well-developed.   HENT:      Head: Normocephalic and atraumatic.      Right Ear: Tympanic membrane, ear canal and external ear normal.      Left Ear: Tympanic membrane, ear canal and external ear normal.      Mouth/Throat:      Pharynx: No posterior  oropharyngeal erythema.   Eyes:      Conjunctiva/sclera: Conjunctivae normal.   Cardiovascular:      Rate and Rhythm: Normal rate and regular rhythm.      Heart sounds: Normal heart sounds.   Pulmonary:      Effort: Pulmonary effort is normal.      Breath sounds: Normal breath sounds.   Musculoskeletal:      Cervical back: Normal range of motion and neck supple.   Skin:     General: Skin is warm and dry.   Neurological:      Mental Status: He is alert and oriented to person, place, and time.   Psychiatric:         Behavior: Behavior normal.         Assessment & Plan:   1. Annual physical exam    2. Prediabetes    3. History of gout    4. Malignant neoplasm of urinary bladder, unspecified site (HCC)    5. Essential hypertension    6. Hypertriglyceridemia    Reviewed age-appropriate preventive health and safety recommendations with patient. Reviewed lab results. Encouraged regular exercise and healthy eating.   Limit carbs and sugars.   No recent flares, continue Allopurinol.   F/u Urology as scheduled.   BP controlled on med. Continue.   Controlled on med. Continue.     No orders of the defined types were placed in this encounter.      Meds This Visit:  Requested Prescriptions      No prescriptions requested or ordered in this encounter       Imaging & Referrals:  None

## 2024-09-06 ENCOUNTER — TELEPHONE (OUTPATIENT)
Dept: SURGERY | Facility: CLINIC | Age: 64
End: 2024-09-06

## 2024-09-06 NOTE — TELEPHONE ENCOUNTER
This encounter is now closed.     RN called patient and offered 10/22 cysto appt with Dr Oropeza instead. He agreed to plans.

## 2024-09-06 NOTE — TELEPHONE ENCOUNTER
Per patient 10/24 does not work and needs to reschedule his cysto. Please see signed encounter 9/6 and advise

## 2024-10-02 RX ORDER — ALLOPURINOL 300 MG/1
300 TABLET ORAL DAILY
Qty: 90 TABLET | Refills: 3 | Status: SHIPPED | OUTPATIENT
Start: 2024-10-02

## 2024-10-02 NOTE — TELEPHONE ENCOUNTER
Please review.  Protocol failed / Has no protocol.     Requested Prescriptions   Pending Prescriptions Disp Refills    ALLOPURINOL 300 MG Oral Tab [Pharmacy Med Name: ALLOPURINOL 300 MG TABLET] 90 tablet 3     Sig: TAKE 1 TABLET BY MOUTH EVERY DAY       There is no refill protocol information for this order        Future Appointments         Provider Department Appt Notes    In 2 weeks Chase Oropeza MD [facility] cystoscopy. confirmed          Recent Outpatient Visits              2 months ago Annual physical exam    [facility], [address] [place] [address], Page Lora DO    Office Visit    1 year ago Annual physical exam    [facility], [address] [place] [address], Page Lora DO    Office Visit    1 year ago Essential hypertension    [facility], [address] [place] [address], Page Lora DO    Office Visit    1 year ago Malignant neoplasm of urinary bladder, unspecified site (HCC)    [facility] Chase Oropeza MD    Office Visit    2 years ago Preoperative examination    [facility], [address] [place] [address], Page Lora DO    Office Visit

## 2024-10-22 ENCOUNTER — PROCEDURE (OUTPATIENT)
Dept: SURGERY | Facility: CLINIC | Age: 64
End: 2024-10-22

## 2024-10-22 DIAGNOSIS — R82.90 URINE FINDING: Primary | ICD-10-CM

## 2024-10-22 PROCEDURE — 81003 URINALYSIS AUTO W/O SCOPE: CPT | Performed by: SURGERY

## 2024-10-22 PROCEDURE — 52000 CYSTOURETHROSCOPY: CPT | Performed by: SURGERY

## 2024-10-22 RX ORDER — CIPROFLOXACIN 500 MG/1
500 TABLET, FILM COATED ORAL ONCE
Status: COMPLETED | OUTPATIENT
Start: 2024-10-22 | End: 2024-10-22

## 2024-10-22 RX ADMIN — CIPROFLOXACIN 500 MG: 500 TABLET, FILM COATED ORAL at 09:49:00

## 2024-10-22 NOTE — PROCEDURES
Clinic Procedure Note    INDICATIONS:   Huang Kapoor is a 63 year old male with history of GERD, gout, hypertension, bladder cancer.  He was previously followed by [facility] and Dr. Frederick Cruz, last seen on 7/6/2022.  On that date he was taken to the operating room for cystoscopy, urethral dilation of bulbar stricture, TURBT of a posterior wall bladder mass (pathology benign).  Back in July 2021 he had another TURBT of a greater than 3 cm posterior wall bladder mass showing HGTa urothelial carcinoma.  Prior to this, he was initially diagnosed with bladder cancer back in 2001.  He had 2 cycles of BCG followed by multiple negative cystoscopies.  He had not had a cystoscopy for some time, but then an episode of gross hematuria in 2021 prompted repeat work-up.     He also notes that his last cystoscopy in November 2022 was normal.  In July 2022 he had a clinic cystoscopy and bladder biopsy that was benign.  Back in 2021 after his initial diagnosis of this recurrence, he underwent 1 dose of BCG but did not tolerate it with significant joint symptoms and weakness.  He denies recent gross hematuria.  He has mild weak urinary stream that is well controlled on tamsulosin.     He transferred his care to me.  His most recent cystoscopy on 4/9/24 was normal.  I also ordered a CT urogram since he had not had upper tract imaging in a while and this was normal as well.  Urine cytology 10/5/2023 negative.     Initial Diagnosis: 2001, then 7/2021 recurrence  Pathology: HGTa  Multifocal? No  Greater than 3 cm? Yes  Last Recurrence: 2021  AUA Risk Category: High   Last upper tract imaging: Normal CT urogram 9/5/2023  Intravesical therapy: BCG x 1 dose in 2021 (did not tolerate)    PROCEDURE:       1. Flexible cystourethroscopy    DATE OF PROCEDURE: 10/22/2024     PRE-PROCEDURE DIAGNOSIS: Bladder cancer    POST-PROCEDURE DIAGNOSIS: Same     SURGEON: Chase Oropeza MD    FINDINGS:    Urethra: Orthotopic meatus, normal  caliber urethra throughout without lesions    Prostate: Mildly enlarged without signs of obstruction, mildly high bladder neck, minimal intravesical protrusion    Bladder: Normal mucosa with no papillary lesions or erythema, minimal trabeculation    Ureteral orifices: Orthotopic    Other findings: None    PROCEDURE:   Patient was brought to the procedure suite and a time-out was performed identifiying the patient,  and procedure to be performed. The risks and benefits of the procedure were once again discussed with the patient including bleeding, infection, and dysuria. The patient agreed to proceed. The patient did not have any signs or symptoms of active UTI.    He was placed in supine position on the table and the penis was prepped and draped in the standard sterile fashion. Urojet was instilled per urethra for local anesthetic effect. A flexible cystoscope was inserted per urethra. The bladder was fully inspected (including retroflexion) and showed findings as above. Both ureteral orifices were orthotopic. The prostate was carefully viewed on removal of the scope, with findings as above. The scope was then carefully removed.    There were no complications and the patient tolerated the procedure well.    IMPRESSION:  Huang Kapoor is a 63 year old male with history of GERD, gout, hypertension, bladder cancer.  He was previously followed by [facility] and Dr. Frederick Cruz, last seen on 2022.  On that date he was taken to the operating room for cystoscopy, urethral dilation of bulbar stricture, TURBT of a posterior wall bladder mass (pathology benign).  Back in 2021 he had another TURBT of a greater than 3 cm posterior wall bladder mass showing HGTa urothelial carcinoma.  Prior to this, he was initially diagnosed with bladder cancer back in .  He had 2 cycles of BCG followed by multiple negative cystoscopies.  He had not had a cystoscopy for some time, but then an episode of gross  hematuria in 2021 prompted repeat work-up.     He also notes that his last cystoscopy in November 2022 was normal.  In July 2022 he had a clinic cystoscopy and bladder biopsy that was benign.  Back in 2021 after his initial diagnosis of this recurrence, he underwent 1 dose of BCG but did not tolerate it with significant joint symptoms and weakness.  He denies recent gross hematuria.  He has mild weak urinary stream that is well controlled on tamsulosin.     He transferred his care to me.  His most recent cystoscopy on 4/9/24 was normal.  I also ordered a CT urogram since he had not had upper tract imaging in a while and this was normal as well.  Urine cytology 10/5/2023 negative.     Initial Diagnosis: 2001, then 7/2021 recurrence  Pathology: HGTa  Multifocal? No  Greater than 3 cm? Yes  Last Recurrence: 2021  AUA Risk Category: High   Last upper tract imaging: Normal CT urogram 9/5/2023  Intravesical therapy: BCG x 1 dose in 2021 (did not tolerate)    Normal cystoscopy today.    PLAN:   -Follow-up urine cytology  -Return to clinic for next cystoscopy in 6 months           Chase Oropeza MD  Staff Urologist  [facility]  Office: [phone]

## 2024-10-23 LAB — NON GYNE INTERPRETATION: NEGATIVE

## 2024-10-23 NOTE — PROGRESS NOTES
Normal urine cytology.    LetMeGo message sent to patient.    Future Appointments  4/22/2025  10:00 AM   Chase Oropeza MD           [id number]           [facility]

## 2024-11-04 ENCOUNTER — IMMUNIZATION (OUTPATIENT)
Dept: INTERNAL MEDICINE CLINIC | Facility: CLINIC | Age: 64
End: 2024-11-04
Payer: COMMERCIAL

## 2024-11-04 DIAGNOSIS — Z23 NEED FOR VACCINATION: Primary | ICD-10-CM

## 2024-11-04 PROCEDURE — 90656 IIV3 VACC NO PRSV 0.5 ML IM: CPT | Performed by: FAMILY MEDICINE

## 2024-11-04 PROCEDURE — 90471 IMMUNIZATION ADMIN: CPT | Performed by: FAMILY MEDICINE

## 2024-12-11 RX ORDER — TAMSULOSIN HYDROCHLORIDE 0.4 MG/1
0.4 CAPSULE ORAL
Qty: 90 CAPSULE | Refills: 5 | Status: SHIPPED | OUTPATIENT
Start: 2024-12-11

## 2025-01-12 ENCOUNTER — HOSPITAL ENCOUNTER (EMERGENCY)
Age: 65
Discharge: HOME | End: 2025-01-12
Payer: COMMERCIAL

## 2025-01-12 ENCOUNTER — APPOINTMENT (OUTPATIENT)
Dept: GENERAL RADIOLOGY | Age: 65
End: 2025-01-12
Payer: COMMERCIAL

## 2025-01-12 VITALS
RESPIRATION RATE: 18 BRPM | WEIGHT: 210 LBS | BODY MASS INDEX: 31.1 KG/M2 | HEART RATE: 90 BPM | SYSTOLIC BLOOD PRESSURE: 145 MMHG | TEMPERATURE: 98 F | HEIGHT: 69 IN | OXYGEN SATURATION: 94 % | DIASTOLIC BLOOD PRESSURE: 78 MMHG

## 2025-01-12 DIAGNOSIS — R05.1 ACUTE COUGH: Primary | ICD-10-CM

## 2025-01-12 PROCEDURE — 71046 X-RAY EXAM CHEST 2 VIEWS: CPT

## 2025-01-12 PROCEDURE — 99283 EMERGENCY DEPT VISIT LOW MDM: CPT

## 2025-01-12 PROCEDURE — 99284 EMERGENCY DEPT VISIT MOD MDM: CPT

## 2025-01-12 RX ORDER — PREDNISONE 20 MG/1
40 TABLET ORAL DAILY
Qty: 10 TABLET | Refills: 0 | Status: SHIPPED | OUTPATIENT
Start: 2025-01-12 | End: 2025-01-17

## 2025-01-12 RX ORDER — DOXYCYCLINE 100 MG/1
100 CAPSULE ORAL 2 TIMES DAILY
Qty: 10 CAPSULE | Refills: 0 | Status: SHIPPED | OUTPATIENT
Start: 2025-01-12 | End: 2025-01-17

## 2025-01-12 RX ORDER — ALBUTEROL SULFATE 90 UG/1
2 INHALANT RESPIRATORY (INHALATION) EVERY 4 HOURS PRN
Qty: 1 EACH | Refills: 0 | Status: SHIPPED | OUTPATIENT
Start: 2025-01-12 | End: 2025-02-11

## 2025-01-12 RX ORDER — BENZONATATE 100 MG/1
100 CAPSULE ORAL 3 TIMES DAILY PRN
Qty: 20 CAPSULE | Refills: 0 | Status: SHIPPED | OUTPATIENT
Start: 2025-01-12

## 2025-01-12 NOTE — ED INITIAL ASSESSMENT (HPI)
Went to IC this morning because he has been sick for a week with URI symptoms- states he is \"tired of being sick\"-- hx of COPD - states his O2 sat usually runs in low 90s-- neg flu and Covid at clinic- thinks he was sent here for low O2 sats

## 2025-01-12 NOTE — DISCHARGE INSTRUCTIONS
Start taking prednisone daily as well as doxycycline, take to completion.  Use albuterol inhaler 2 puffs every 4-6 hours or as needed for cough/wheezing.  Take Tessalon Perles for cough as directed.  Return to the ER for any other new or worsening symptoms.

## 2025-01-12 NOTE — ED QUICK NOTES
Pt ambulated in the [facility]son with this RN. Pulse ox ranged from 93-94%. Pt states that is his baseline O2.

## 2025-01-12 NOTE — ED PROVIDER NOTES
Patient Seen in: [facility] [place]      History     Chief Complaint   Patient presents with    Cough/URI     Stated Complaint: cough/sob/low o2  sats    Subjective:   HPI      64-year-old male with past medical history of bladder cancer, COPD, hypertension, acid reflux who presents to the ER for cough for the past 7 days.  Taking Mucinex with no relief.  Symptoms started with rhinorrhea as well but this has resolved.  Reports multiple sick contacts at home.  Denies any vomiting or diarrhea, leg swelling, chest pain or any other complaints.    Objective:     Past Medical History:    Acute renal failure (ARF) (HCC)    Bladder cancer (HCC)    Colitis, Clostridium difficile    COPD (chronic obstructive pulmonary disease) (HCC)    Esophageal reflux    Gout    Unspecified essential hypertension              Past Surgical History:   Procedure Laterality Date    Other accessory      bladder ca                Social History     Socioeconomic History    Marital status:    Tobacco Use    Smoking status: Former     Current packs/day: 1.50     Average packs/day: 1.5 packs/day for 30.0 years (45.0 ttl pk-yrs)     Types: Cigarettes    Smokeless tobacco: Never   Vaping Use    Vaping status: Never Used   Substance and Sexual Activity    Alcohol use: No    Drug use: No     Social Drivers of Health      Received from [facility], [facility]    Social Connections    Received from [facility], [facility]    Housing Stability                  Physical Exam     ED Triage Vitals [01/12/25 1023]   /78   Pulse 92   Resp 18   Temp 97.9 °F (36.6 °C)   Temp src Temporal   SpO2 93 %   O2 Device None (Room air)       Current Vitals:   Vital Signs  BP: 145/78  Pulse: 92  Resp: 18  Temp: 97.9 °F (36.6 °C)  Temp src: Temporal    Oxygen Therapy  SpO2: 93 %  O2 Device: None (Room air)        Physical Exam  GENERAL APPEARANCE:  AxOx4,  generally well-appearing, no acute distress.  HEENT:  NC, AT. MMM. EOMI, clear conjunctiva, oropharynx clear.  NECK:  Supple without lymphadenopathy.  No stiffness or restricted ROM.  HEART:  Normal rate and regular rhythm, normal S1/S1, no m/r/g  LUNGS:  CTAB, moving air well. No crackles or wheezes are heard.  ABDOMEN:  Soft, nontender, nondistended with good bowel sounds heard.  BACK: No CVAT, no obvious deformity.  EXTREMITIES:  Without cyanosis, clubbing or edema.  NEUROLOGICAL:  Grossly nonfocal. Alert and oriented, moving all 4 extremities. CN not formally tested but appear grossly intact. Observed to ambulate with normal gait.  Skin:  Warm and dry without any rash.        ED Course   Labs Reviewed - No data to display         XR CHEST PA + LAT CHEST (CPT=71046)    Result Date: 1/12/2025  PROCEDURE:  XR CHEST PA + LAT CHEST (CPT=71046)  INDICATIONS:  cough/sob/low o2  sats  COMPARISON:  [facility] , XR, XR CHEST PA + LAT CHEST (CPT=71020), 11/07/2017, 10:38 AM.  TECHNIQUE:  PA and lateral chest radiographs were obtained.  PATIENT STATED HISTORY: (As transcribed by Technologist)  Patient has a productive cough and nasal and chest congestion for 7 days.    FINDINGS:  LUNGS:  Slight atelectasis in the lung bases. CARDIAC:  Normal size cardiac silhouette.  Normal pulmonary vascularity. MEDIASTINUM:  Normal. PLEURA:  No pneumothorax.  No pleural effusions. BONES:  Normal for age.            CONCLUSION:  Slight bibasilar atelectasis..   LOCATION:  [facility]   Dictated by (CST): Ania Bills MD on 1/12/2025 at 10:42 AM     Finalized by (CST): Ania Bills MD on 1/12/2025 at 10:43 AM             MDM      64-year-old male who presents ER for cough for the past 7 days.  Vitals within normal limits, pulse oximetry 93%, ambulated with pulse oximetry and maintained above 93% which is patient's baseline.  Differential diagnosis includes but does not exclude COPD exacerbation versus pneumonia versus viral syndrome.  Chest  x-ray shows bilateral atelectasis but no other acute findings noted.  Discussed continued supportive management with patient, will initiate prednisone, albuterol, Doxy, Tessalon Perles.  Discussed return precautions.  Ready for discharge.        Medical Decision Making      Disposition and Plan     Clinical Impression:  1. Acute cough         Disposition:  Discharge  1/12/2025 12:19 pm    Follow-up:  Page Swartz DO  [address] [place] [address], [place] [address]  [phone]    Follow up            Medications Prescribed:  Current Discharge Medication List        START taking these medications    Details   benzonatate 100 MG Oral Cap Take 1 capsule (100 mg total) by mouth 3 (three) times daily as needed for cough.  Qty: 20 capsule, Refills: 0      !! albuterol 108 (90 Base) MCG/ACT Inhalation Aero Soln Inhale 2 puffs into the lungs every 4 (four) hours as needed for Wheezing.  Qty: 1 each, Refills: 0      predniSONE 20 MG Oral Tab Take 2 tablets (40 mg total) by mouth daily for 5 days.  Qty: 10 tablet, Refills: 0      doxycycline 100 MG Oral Cap Take 1 capsule (100 mg total) by mouth 2 (two) times daily for 5 days.  Qty: 10 capsule, Refills: 0       !! - Potential duplicate medications found. Please discuss with provider.              Supplementary Documentation:

## 2025-01-13 ENCOUNTER — PATIENT OUTREACH (OUTPATIENT)
Dept: CASE MANAGEMENT | Age: 65
End: 2025-01-13

## 2025-01-13 NOTE — PROGRESS NOTES
[facility] Follow up for PCP (Discharge 1/11 edw)       PCP  Page Swartz   [address], [address]   [facility] [address] [phone]     Rather see specialist existing appt for 1/16; pt declined to make follow up appt     Confirmed with pt   Closing encounter      14-Sep-2024 02:44

## 2025-03-12 ENCOUNTER — OFFICE VISIT (OUTPATIENT)
Dept: INTERNAL MEDICINE CLINIC | Facility: CLINIC | Age: 65
End: 2025-03-12
Payer: COMMERCIAL

## 2025-03-12 VITALS
SYSTOLIC BLOOD PRESSURE: 138 MMHG | RESPIRATION RATE: 18 BRPM | HEART RATE: 94 BPM | TEMPERATURE: 99 F | DIASTOLIC BLOOD PRESSURE: 64 MMHG | WEIGHT: 215.81 LBS | HEIGHT: 68.5 IN | BODY MASS INDEX: 32.33 KG/M2 | OXYGEN SATURATION: 96 %

## 2025-03-12 DIAGNOSIS — I10 ESSENTIAL HYPERTENSION: Primary | ICD-10-CM

## 2025-03-12 PROCEDURE — 99213 OFFICE O/P EST LOW 20 MIN: CPT | Performed by: FAMILY MEDICINE

## 2025-03-12 PROCEDURE — 90677 PCV20 VACCINE IM: CPT | Performed by: FAMILY MEDICINE

## 2025-03-12 PROCEDURE — G2211 COMPLEX E/M VISIT ADD ON: HCPCS | Performed by: FAMILY MEDICINE

## 2025-03-12 PROCEDURE — 3078F DIAST BP <80 MM HG: CPT | Performed by: FAMILY MEDICINE

## 2025-03-12 PROCEDURE — 3008F BODY MASS INDEX DOCD: CPT | Performed by: FAMILY MEDICINE

## 2025-03-12 PROCEDURE — 90472 IMMUNIZATION ADMIN EACH ADD: CPT | Performed by: FAMILY MEDICINE

## 2025-03-12 PROCEDURE — 90715 TDAP VACCINE 7 YRS/> IM: CPT | Performed by: FAMILY MEDICINE

## 2025-03-12 PROCEDURE — 3075F SYST BP GE 130 - 139MM HG: CPT | Performed by: FAMILY MEDICINE

## 2025-03-12 PROCEDURE — 90471 IMMUNIZATION ADMIN: CPT | Performed by: FAMILY MEDICINE

## 2025-03-12 RX ORDER — BUDESONIDE AND FORMOTEROL FUMARATE DIHYDRATE 80; 4.5 UG/1; UG/1
2 AEROSOL RESPIRATORY (INHALATION) 2 TIMES DAILY
COMMUNITY
Start: 2025-01-15

## 2025-03-14 NOTE — PROGRESS NOTES
Subjective:   Patient ID: Huang Kapoor is a 64 year old male.    HPI Here for f/u on HTN. Taking meds as prescribed with no issues.     History/Other:   Review of Systems   Respiratory:  Negative for chest tightness and shortness of breath.    Cardiovascular:  Negative for chest pain and palpitations.   Neurological:  Negative for dizziness and light-headedness.     Current Outpatient Medications   Medication Sig Dispense Refill    Budesonide-Formoterol Fumarate 80-4.5 MCG/ACT Inhalation Aerosol Inhale 2 puffs into the lungs 2 (two) times daily.      benzonatate 100 MG Oral Cap Take 1 capsule (100 mg total) by mouth 3 (three) times daily as needed for cough. 20 capsule 0    TAMSULOSIN 0.4 MG Oral Cap TAKE 1 CAPSULE (0.4 MG TOTAL) BY MOUTH AFTER DINNER 90 capsule 5    allopurinol 300 MG Oral Tab Take 1 tablet (300 mg total) by mouth daily. 90 tablet 3    triamcinolone 0.1 % External Cream 1 Ring every 28 days. FOR 21 DAYS IN, THEN REMOVE FOR 7 DAYS      sertraline 25 MG Oral Tab Take 2 tablets (50 mg total) by mouth daily. 180 tablet 1    amLODIPine 5 MG Oral Tab Take 1 tablet (5 mg total) by mouth daily. 90 tablet 0    rosuvastatin 10 MG Oral Tab Take 1 tablet (10 mg total) by mouth daily. 90 tablet 2    HYDROCHLOROTHIAZIDE 25 MG Oral Tab TAKE 1 TABLET (25 MG TOTAL) BY MOUTH DAILY. 90 tablet 0    losartan 100 MG Oral Tab Take 1 tablet (100 mg total) by mouth daily. 90 tablet 0    losartan 100 MG Oral Tab Take 1 tablet (100 mg total) by mouth daily. 90 tablet 1    metoprolol succinate ER 50 MG Oral Tablet 24 Hr Take 1 tablet (50 mg total) by mouth daily. 90 tablet 1    montelukast 10 MG Oral Tab Take 1 tablet (10 mg total) by mouth daily.      albuterol 108 (90 Base) MCG/ACT Inhalation Aero Soln Inhale into the lungs every 6 (six) hours as needed for Wheezing.       Allergies:Allergies[1]    Objective:   Physical Exam  Vitals reviewed.   Constitutional:       Appearance: Normal appearance. He is  well-developed.   HENT:      Head: Normocephalic and atraumatic.   Cardiovascular:      Rate and Rhythm: Normal rate and regular rhythm.      Heart sounds: Normal heart sounds.   Pulmonary:      Effort: Pulmonary effort is normal.      Breath sounds: Normal breath sounds.   Neurological:      Mental Status: He is alert.   Psychiatric:         Mood and Affect: Mood normal.         Behavior: Behavior normal.         Assessment & Plan:   1. Essential hypertension    BP controlled on med. Continue.     Orders Placed This Encounter   Procedures    TdaP (Boostrix) Vaccine (> 7 Y)    Prevnar 20 (PCV20) [95709]       Meds This Visit:  Requested Prescriptions      No prescriptions requested or ordered in this encounter       Imaging & Referrals:  TETANUS, DIPHTHERIA TOXOIDS AND ACELLULAR PERTUSIS VACCINE (TDAP), >7 YEARS, IM USE  PCV20 VACCINE FOR INTRAMUSCULAR USE         [1]   Allergies  Allergen Reactions    Sulfa Antibiotics SWELLING    Pcn [Penicillins]

## 2025-04-22 ENCOUNTER — PROCEDURE (OUTPATIENT)
Dept: SURGERY | Facility: CLINIC | Age: 65
End: 2025-04-22

## 2025-04-22 DIAGNOSIS — R82.90 URINE FINDING: ICD-10-CM

## 2025-04-22 DIAGNOSIS — C67.9 MALIGNANT NEOPLASM OF URINARY BLADDER, UNSPECIFIED SITE (HCC): Primary | ICD-10-CM

## 2025-04-22 LAB
APPEARANCE: CLEAR
BILIRUBIN: NEGATIVE
GLUCOSE (URINE DIPSTICK): NEGATIVE MG/DL
KETONES (URINE DIPSTICK): NEGATIVE MG/DL
LEUKOCYTES: NEGATIVE
MULTISTIX LOT#: ABNORMAL NUMERIC
NITRITE, URINE: NEGATIVE
PH, URINE: 6 (ref 4.5–8)
PROTEIN (URINE DIPSTICK): NEGATIVE MG/DL
SPECIFIC GRAVITY: 1.01 (ref 1–1.03)
URINE-COLOR: YELLOW
UROBILINOGEN,SEMI-QN: 0.2 MG/DL (ref 0–1.9)

## 2025-04-22 PROCEDURE — 52000 CYSTOURETHROSCOPY: CPT | Performed by: SURGERY

## 2025-04-22 PROCEDURE — 81003 URINALYSIS AUTO W/O SCOPE: CPT | Performed by: SURGERY

## 2025-04-22 RX ORDER — CIPROFLOXACIN 500 MG/1
500 TABLET, FILM COATED ORAL ONCE
Status: COMPLETED | OUTPATIENT
Start: 2025-04-22 | End: 2025-04-22

## 2025-04-22 RX ORDER — SILDENAFIL 100 MG/1
TABLET, FILM COATED ORAL
Qty: 30 TABLET | Refills: 11 | Status: SHIPPED | OUTPATIENT
Start: 2025-04-22

## 2025-04-22 RX ADMIN — CIPROFLOXACIN 500 MG: 500 TABLET, FILM COATED ORAL at 09:59:00

## 2025-04-22 NOTE — PROCEDURES
Clinic Procedure Note    HPI/Assessment:   Huang Kapoor is a 63 year old male with history of GERD, gout, hypertension, bladder cancer.  He was previously followed by [facility] and Dr. Frederick Cruz, last seen on 7/6/2022.  On that date he was taken to the operating room for cystoscopy, urethral dilation of bulbar stricture, TURBT of a posterior wall bladder mass (pathology benign).  Back in July 2021 he had another TURBT of a greater than 3 cm posterior wall bladder mass showing HGTa urothelial carcinoma.  Prior to this, he was initially diagnosed with bladder cancer back in 2001.  He had 2 cycles of BCG followed by multiple negative cystoscopies.  He had not had a cystoscopy for some time, but then an episode of gross hematuria in 2021 prompted repeat work-up.     He also notes that his last cystoscopy in November 2022 was normal.  In July 2022 he had a clinic cystoscopy and bladder biopsy that was benign.  Back in 2021 after his initial diagnosis of this recurrence, he underwent 1 dose of BCG but did not tolerate it with significant joint symptoms and weakness.  He denies recent gross hematuria.  He has mild weak urinary stream that is well controlled on tamsulosin.     He transferred his care to me.  His most recent cystoscopy on 4/9/24 was normal.  I also ordered a CT urogram since he had not had upper tract imaging in a while and this was normal as well.  Urine cytology 10/5/2023 negative.     Initial Diagnosis: 2001, then 7/2021 recurrence  Pathology: HGTa  Multifocal? No  Greater than 3 cm? Yes  Last Recurrence: 2021  AUA Risk Category: High   Last upper tract imaging: Normal CT urogram 9/5/2023  Intravesical therapy: BCG x 1 dose in 2021 (did not tolerate)    Last cystoscopy 10/22/2024 was normal.    No signs of recurrence on cystoscopy today.    He is also interested in ED medications.  He has not tried them before but has issues with erections.    Plan:   -Next surveillance cystoscopy in 6  months (can go to yearly after 2026)  -Follow-up urine cytology  -CT urogram in 6 months  - Start Viagra  mg as needed             PROCEDURE:       1. Flexible cystourethroscopy    DATE OF PROCEDURE: 2025     PRE-PROCEDURE DIAGNOSIS: Bladder cancer    POST-PROCEDURE DIAGNOSIS: Same     SURGEON: Chase Oropeza MD    FINDINGS:    Urethra: Orthotopic meatus, normal caliber urethra throughout without lesions    Prostate: Moderately enlarged and obstructive, mild intravesical protrusion    Bladder: Normal mucosa with no papillary lesions or erythema, moderate trabeculation    Ureteral orifices: Orthotopic    Other findings: None    PROCEDURE:   Patient was brought to the procedure suite and a time-out was performed identifiying the patient,  and procedure to be performed. The risks and benefits of the procedure were once again discussed with the patient including bleeding, infection, and dysuria. The patient agreed to proceed. The patient did not have any signs or symptoms of active UTI.    He was placed in supine position on the table and the penis was prepped and draped in the standard sterile fashion. Urojet was instilled per urethra for local anesthetic effect. A flexible cystoscope was inserted per urethra. The bladder was fully inspected (including retroflexion) and showed findings as above. Both ureteral orifices were orthotopic. The prostate was carefully viewed on removal of the scope, with findings as above. The scope was then carefully removed.    There were no complications and the patient tolerated the procedure well.    Chase Oropeza MD  Staff Urologist  [facility]  Office: [phone]

## 2025-04-23 LAB — NON GYNE INTERPRETATION: NEGATIVE

## 2025-04-23 NOTE — PROGRESS NOTES
Normal urine cytology.    [employer] message sent to patient.    Future Appointments  9/2/2025   9:00 AM    Page Swartz DO [facility]

## 2025-06-09 RX ORDER — SERTRALINE HYDROCHLORIDE 25 MG/1
50 TABLET, FILM COATED ORAL DAILY
Qty: 180 TABLET | Refills: 3 | Status: SHIPPED | OUTPATIENT
Start: 2025-06-09

## 2025-07-01 ENCOUNTER — TELEPHONE (OUTPATIENT)
Dept: SURGERY | Facility: CLINIC | Age: 65
End: 2025-07-01

## 2025-07-02 NOTE — TELEPHONE ENCOUNTER
This encounter is now closed.     RN called patient to follow up on offered cysto appt in Sept  Patient requesting October. Offered 10/13 Monday at 11:30 AM  He agreed to plans.

## 2025-07-09 ENCOUNTER — TELEPHONE (OUTPATIENT)
Age: 65
End: 2025-07-09

## 2025-07-09 DIAGNOSIS — R82.90 URINE FINDING: ICD-10-CM

## 2025-07-09 DIAGNOSIS — I10 ESSENTIAL HYPERTENSION: ICD-10-CM

## 2025-07-09 DIAGNOSIS — Z13.0 SCREENING FOR DISORDER OF BLOOD AND BLOOD-FORMING ORGANS: ICD-10-CM

## 2025-07-09 DIAGNOSIS — Z12.5 SCREENING FOR MALIGNANT NEOPLASM OF PROSTATE: ICD-10-CM

## 2025-07-09 DIAGNOSIS — C67.9 MALIGNANT NEOPLASM OF URINARY BLADDER, UNSPECIFIED SITE (HCC): ICD-10-CM

## 2025-07-09 DIAGNOSIS — E78.1 HYPERTRIGLYCERIDEMIA: ICD-10-CM

## 2025-07-09 NOTE — TELEPHONE ENCOUNTER
Future Appointments   Date Time Provider Department Center   9/10/2025  3:00 PM Page Swartz DO [facility]   10/13/2025 11:30 AM Chase Oropeza MD [id number]     Orders to edward     Pt informed that labs need to be completed no sooner than 2 weeks prior to the appt. Pt aware to fast-no call back required

## (undated) NOTE — LETTER
[facility], [address] [place] [id number]  PH: [phone]  FAX: [phone]        24  Huang Kapoor, :  1960  [address] [facility] [address]      Dear Huang Kapoor,    My office staff has attempted to contact you at my request.  It is important for your health and well-being that you contact my office for the following reason(s):      _x__ Schedule office visit/office procedure    I believe that the relationship between a physician and their patient is one of communication and mutual cooperation.  It is important for the patient to follow through with the recommendations made by their Doctor in order to achieve the best possible outcome. Please contact our office at [phone].      Sincerely,  Dr. Swartz  [facility]

## (undated) NOTE — ED AVS SNAPSHOT
Homa Mccormick   MRN: [[id number]]    Department:  [facility] Emergency Department   Date of Visit:  11/7/2017           Disclosure     Insurance plans vary and the physician(s) referred by the ER may not be covered by your plan.  Please contact y If you have been prescribed any medication(s), please fill your prescription right away and begin taking the medication(s) as directed    If the emergency physician has read X-rays, these will be re-interpreted by a radiologist.  If there is a significant

## (undated) NOTE — ED AVS SNAPSHOT
Richard Bear   MRN: [[id number]]    Department:  [facility] Emergency Department   Date of Visit:  11/18/2019           Disclosure     Insurance plans vary and the physician(s) referred by the ER may not be covered by your plan.  Please contact tell this physician (or your personal doctor if your instructions are to return to your personal doctor) about any new or lasting problems. The primary care or specialist physician will see patients referred from the [facility] Emergency Department.  William Wlalis